# Patient Record
Sex: FEMALE | Race: BLACK OR AFRICAN AMERICAN | NOT HISPANIC OR LATINO | Employment: FULL TIME | URBAN - METROPOLITAN AREA
[De-identification: names, ages, dates, MRNs, and addresses within clinical notes are randomized per-mention and may not be internally consistent; named-entity substitution may affect disease eponyms.]

---

## 2021-03-05 ENCOUNTER — OCCUPATIONAL HEALTH (OUTPATIENT)
Dept: URGENT CARE | Facility: CLINIC | Age: 46
End: 2021-03-05

## 2021-03-05 DIAGNOSIS — Z02.83 ENCOUNTER FOR DRUG SCREENING: Primary | ICD-10-CM

## 2021-03-05 DIAGNOSIS — Z02.89 ENCOUNTER FOR EXAMINATION REQUIRED BY DEPARTMENT OF TRANSPORTATION (DOT): Primary | ICD-10-CM

## 2021-03-05 PROCEDURE — 80305 DRUG TEST PRSMV DIR OPT OBS: CPT | Mod: S$GLB,,, | Performed by: PHYSICIAN ASSISTANT

## 2021-03-05 PROCEDURE — 99499 UNLISTED E&M SERVICE: CPT | Mod: S$GLB,,, | Performed by: PHYSICIAN ASSISTANT

## 2021-03-05 PROCEDURE — 80305 OOH COLLECTION ONLY DRUG SCREEN: ICD-10-PCS | Mod: S$GLB,,, | Performed by: PHYSICIAN ASSISTANT

## 2021-03-05 PROCEDURE — 99499 PHYSICAL, RECERT DOT/CDL: ICD-10-PCS | Mod: S$GLB,,, | Performed by: PHYSICIAN ASSISTANT

## 2022-01-26 ENCOUNTER — TELEPHONE (OUTPATIENT)
Dept: BARIATRICS | Facility: CLINIC | Age: 47
End: 2022-01-26
Payer: COMMERCIAL

## 2022-01-26 NOTE — TELEPHONE ENCOUNTER
----- Message from Micheline Fregoso sent at 1/26/2022 11:59 AM CST -----  Regarding: speak with nurse  Contact: patient  [phone] please call patient have question for the nurse concerning a procedure the doctor does said the doctor performed surgery in 2007 now looking to schedule another surgery waiting on a call back thanks.

## 2022-01-26 NOTE — TELEPHONE ENCOUNTER
Phoned pt in regards to interest in scheduling Bariatric surgery. Pt confirm she is interested and stated she will call back in 5 minutes.

## 2022-04-01 ENCOUNTER — TELEPHONE (OUTPATIENT)
Dept: BARIATRICS | Facility: CLINIC | Age: 47
End: 2022-04-01
Payer: COMMERCIAL

## 2022-04-01 NOTE — TELEPHONE ENCOUNTER
----- Message from Silvia Sosa sent at 4/1/2022 11:49 AM CDT -----  Contact: @[phone]  Pt requesting call back to see if she can have her appt now instead of 1:30 today

## 2022-04-11 ENCOUNTER — TELEPHONE (OUTPATIENT)
Dept: BARIATRICS | Facility: CLINIC | Age: 47
End: 2022-04-11
Payer: COMMERCIAL

## 2022-04-11 NOTE — TELEPHONE ENCOUNTER
Called and spoke with pt.  Pt had a LRNY with Dr. Killian in 2007.  Pt scheduled for f/u with Dr. Killian to discuss possible revision.  Text sent for pt to set up [employer] so that pt can have virtual visits.  After mychart is set up, pt will call bariatric clinic to schedule for virtual RD visit.    Legacy documents of operative report scanned into the media.

## 2022-04-11 NOTE — TELEPHONE ENCOUNTER
----- Message from Silvana Miller sent at 4/11/2022 12:25 PM CDT -----  Regarding: Pt Inquiry  Pt called to f/u on what the next step after financial consults is.      Can patient be contacted on [employer]:No       Requesting Call back number:[phone]

## 2022-04-19 ENCOUNTER — TELEPHONE (OUTPATIENT)
Dept: BARIATRICS | Facility: CLINIC | Age: 47
End: 2022-04-19
Payer: COMMERCIAL

## 2022-04-19 NOTE — TELEPHONE ENCOUNTER
----- Message from Silvana Miller sent at 4/19/2022 11:35 AM CDT -----  Regarding: Appt R/s  Pt called to r/s appt set  for 4/19 at 4:15 to next Tuesday as early as possible.     Can patient be contacted on [employer]:YES       Requesting Call back number:[phone]

## 2022-04-19 NOTE — TELEPHONE ENCOUNTER
Phoned pt in regard to rescheduling f/u for Bariatric surgery. Pt stated she had to cancel consult on today because her daughter is in the hospital. Informed pt a virtual consult is a option. Pt stated she was not aware of virtual appts.      Gained assistance from nurse with scheduling virtual consult with  due to no access. Informed pt of virtual visit instructions, pt verbalized understanding. Pt agreed to reach out to clinic if she has any issues with  Logging onto virtual appt.

## 2022-05-03 ENCOUNTER — DOCUMENTATION ONLY (OUTPATIENT)
Dept: BARIATRICS | Facility: CLINIC | Age: 47
End: 2022-05-03
Payer: COMMERCIAL

## 2022-05-03 ENCOUNTER — TELEPHONE (OUTPATIENT)
Dept: BARIATRICS | Facility: CLINIC | Age: 47
End: 2022-05-03

## 2022-05-03 ENCOUNTER — OFFICE VISIT (OUTPATIENT)
Dept: BARIATRICS | Facility: CLINIC | Age: 47
End: 2022-05-03
Payer: COMMERCIAL

## 2022-05-03 DIAGNOSIS — Z98.0 S/P BYPASS GASTROENTEROSTOMY: ICD-10-CM

## 2022-05-03 DIAGNOSIS — Z01.818 PREOPERATIVE TESTING: Primary | ICD-10-CM

## 2022-05-03 PROCEDURE — 99203 OFFICE O/P NEW LOW 30 MIN: CPT | Mod: 95,,, | Performed by: SURGERY

## 2022-05-03 PROCEDURE — 99203 PR OFFICE/OUTPT VISIT, NEW, LEVL III, 30-44 MIN: ICD-10-PCS | Mod: 95,,, | Performed by: SURGERY

## 2022-05-03 RX ORDER — AMLODIPINE BESYLATE 10 MG/1
5 TABLET ORAL DAILY
Status: ON HOLD | COMMUNITY
End: 2023-05-15 | Stop reason: HOSPADM

## 2022-05-03 NOTE — PROGRESS NOTES
"History & Physical    SUBJECTIVE:     History of Present Illness:  Patient is a 46 y.o. female presents with s/p gastric bypass by me 2007 for bmi 59 sherrie on cpap and essential htn.  Since surgery she lost and regained weight.  She also is concerned as she may have recurrence of her sleep apnea.  On her sleep questionnaire she scored 2.  She says she is 5"9" and 305 which makes her bmi 45 (she says she was around 400 at the time of the original surgery.    No chief complaint on file.      Review of patient's allergies indicates:  No Known Allergies    Current Outpatient Medications   Medication Sig Dispense Refill    amLODIPine (NORVASC) 5 MG tablet Take 5 mg by mouth once daily.       No current facility-administered medications for this visit.       Past Medical History:   Diagnosis Date    Hypertension      Past Surgical History:   Procedure Laterality Date    GASTRIC BYPASS       Family History   Problem Relation Age of Onset    Cancer Mother      Social History     Tobacco Use    Smoking status: Never Smoker        Review of Systems:  Review of Systems   Constitutional: Negative for fever.   Respiratory: Negative for chest tightness and shortness of breath.         Gets lazaro   Cardiovascular: Negative for chest pain.   Gastrointestinal: Negative for abdominal pain, constipation, diarrhea, nausea and vomiting.        Denies heartburn.  She takes iron and has some hardish stools.    Genitourinary: Negative for difficulty urinating and dysuria.   Skin: Negative for rash.   Hematological: Does not bruise/bleed easily.       OBJECTIVE:     Vital Signs (Most Recent)              Physical Exam:  Physical Exam    Laboratory  None available    Diagnostic Results:  None available    ASSESSMENT/PLAN:     Morbid obesity with comorbidities.  Gaining weight and would like to lose more weight.    PLAN:       I discussed possible medications for weight loss and she is not interested.  Will do work up for possible redo " gastric pouch procedure for further weight loss and stopping weight regain.         The patient location is: in her car in [place]  The chief complaint leading to consultation is: morbid obesity    Visit type: audio only    17 minutes of total time spent on the encounter, which includes face to face time and non-face to face time preparing to see the patient (eg, review of tests), Obtaining and/or reviewing separately obtained history, Documenting clinical information in the electronic or other health record, Independently interpreting results (not separately reported) and communicating results to the patient/family/caregiver, or Care coordination (not separately reported).     Each patient to whom he or she provides medical services by telemedicine is:  (1) informed of the relationship between the physician and patient and the respective role of any other health care provider with respect to management of the patient; and (2) notified that he or she may decline to receive medical services by telemedicine and may withdraw from such care at any time.

## 2022-05-03 NOTE — TELEPHONE ENCOUNTER
----- Message from Colt Killian MD sent at 5/3/2022  4:58 PM CDT -----  Will do work up for possible redo gastric pouch procedure for further weight loss and stopping weight regain.

## 2022-05-16 ENCOUNTER — TELEPHONE (OUTPATIENT)
Dept: BARIATRICS | Facility: CLINIC | Age: 47
End: 2022-05-16
Payer: COMMERCIAL

## 2022-05-16 NOTE — TELEPHONE ENCOUNTER
----- Message from Rafael Raymundo sent at 5/16/2022  3:06 PM CDT -----  Contact: @ [phone]  Patient calling to get an update on the next step with treatment, pls advise     digital block w 4 ml lidocaine 1 %

## 2022-05-17 ENCOUNTER — PATIENT MESSAGE (OUTPATIENT)
Dept: BARIATRICS | Facility: CLINIC | Age: 47
End: 2022-05-17
Payer: COMMERCIAL

## 2022-05-26 ENCOUNTER — HOSPITAL ENCOUNTER (OUTPATIENT)
Dept: CARDIOLOGY | Facility: CLINIC | Age: 47
Discharge: HOME | End: 2022-05-26
Payer: COMMERCIAL

## 2022-05-26 ENCOUNTER — HOSPITAL ENCOUNTER (OUTPATIENT)
Dept: RADIOLOGY | Facility: HOSPITAL | Age: 47
Discharge: HOME | End: 2022-05-26
Attending: SURGERY
Payer: COMMERCIAL

## 2022-05-26 DIAGNOSIS — Z98.0 S/P BYPASS GASTROENTEROSTOMY: ICD-10-CM

## 2022-05-26 DIAGNOSIS — Z01.818 PREOPERATIVE TESTING: ICD-10-CM

## 2022-05-26 PROCEDURE — 71046 XR CHEST PA AND LATERAL: ICD-10-PCS | Mod: 26,,, | Performed by: RADIOLOGY

## 2022-05-26 PROCEDURE — 93010 EKG 12-LEAD: ICD-10-PCS | Mod: S$GLB,,, | Performed by: INTERNAL MEDICINE

## 2022-05-26 PROCEDURE — 71046 X-RAY EXAM CHEST 2 VIEWS: CPT | Mod: 26,,, | Performed by: RADIOLOGY

## 2022-05-26 PROCEDURE — 71046 X-RAY EXAM CHEST 2 VIEWS: CPT | Mod: TC,FY

## 2022-05-26 PROCEDURE — 93010 ELECTROCARDIOGRAM REPORT: CPT | Mod: S$GLB,,, | Performed by: INTERNAL MEDICINE

## 2022-05-26 PROCEDURE — 93005 EKG 12-LEAD: ICD-10-PCS | Mod: S$GLB,,, | Performed by: SURGERY

## 2022-05-26 PROCEDURE — 93005 ELECTROCARDIOGRAM TRACING: CPT | Mod: S$GLB,,, | Performed by: SURGERY

## 2022-06-02 ENCOUNTER — CLINICAL SUPPORT (OUTPATIENT)
Dept: BARIATRICS | Facility: CLINIC | Age: 47
End: 2022-06-02
Payer: COMMERCIAL

## 2022-06-02 VITALS — HEIGHT: 69 IN | WEIGHT: 293 LBS | BODY MASS INDEX: 43.4 KG/M2

## 2022-06-02 DIAGNOSIS — Z71.3 DIETARY COUNSELING: ICD-10-CM

## 2022-06-02 DIAGNOSIS — Z98.84 S/P BARIATRIC SURGERY: ICD-10-CM

## 2022-06-02 DIAGNOSIS — E66.01 MORBID OBESITY WITH BMI OF 40.0-44.9, ADULT: ICD-10-CM

## 2022-06-02 PROCEDURE — 97802 MEDICAL NUTRITION INDIV IN: CPT | Mod: S$GLB,,, | Performed by: DIETITIAN, REGISTERED

## 2022-06-02 PROCEDURE — 99499 NO LOS: ICD-10-PCS | Mod: S$GLB,,, | Performed by: DIETITIAN, REGISTERED

## 2022-06-02 PROCEDURE — 99499 UNLISTED E&M SERVICE: CPT | Mod: S$GLB,,, | Performed by: DIETITIAN, REGISTERED

## 2022-06-02 PROCEDURE — 99999 PR PBB SHADOW E&M-EST. PATIENT-LVL II: ICD-10-PCS | Mod: PBBFAC,,, | Performed by: DIETITIAN, REGISTERED

## 2022-06-02 PROCEDURE — 97802 PR MED NUTR THER, 1ST, INDIV, EA 15 MIN: ICD-10-PCS | Mod: S$GLB,,, | Performed by: DIETITIAN, REGISTERED

## 2022-06-02 PROCEDURE — 99999 PR PBB SHADOW E&M-EST. PATIENT-LVL II: CPT | Mod: PBBFAC,,, | Performed by: DIETITIAN, REGISTERED

## 2022-06-02 NOTE — PATIENT INSTRUCTIONS
Menu Plan: 800-1000 Calories;  grams of Protein    DAY 1     Breakfast  ½ cup 2% cottage cheese  ¼ cup fruit (no sugar added)    Snack  2% mozzarella string cheese  10 grapes    Lunch  2oz Lean hamburger or turkey toshia  1 slice low-fat cheese  ¼ cup green beans    Snack  200 calorie low-carb protein drink (4 grams sugar or less)    Dinner  2oz chicken thigh  ¼ cup cooked spinach     Snack  Atkins bar (15g protein)      DAY 2    Breakfast  1 egg with 1oz shredded cheddar cheese and 2T salsa    Snack  200 calorie low-carb protein drink (4 grams sugar or less)    Lunch  Lettuce Wraps: 2oz sliced turkey, 1 slice low-fat Swiss cheese, tomato, and mustard wrapped in a Raymond lettuce leaf    Snack  ½ cup low-fat cottage cheese  Pear cup (no sugar added)    Dinner  2oz baked fish  ½ cup cooked broccoli    Snack  Sugar-free pudding cup      DAY 3    Breakfast   ½ cup low-fat ricotta cheese w/ Splenda to harmeet  ½ scoop Vanilla protein powder   ¼ cup fresh fruit    Snack  2% string cheese  6 unsalted almonds    Lunch  Tuna/Chicken Salad: 2oz canned tuna/chicken, 1 egg white, and 1 tsp light paz  Pineapple cup (no sugar added)    Snack  200 calorie low-carb protein drink (4 grams sugar or less)    Dinner  ½ baked pork chop   ¼ cup beans      DAY 4    Breakfast  200 calorie low-carb protein drink (4 grams sugar or less)    Snack  Boiled egg    Lunch  ½ cup grilled shrimp  Salad w/ 2 tbsp crumbled fat-free feta  1 tbsp light vinaigrette    Snack  200 calorie low-carb protein drink (4 grams sugar or less)    Dinner  ¾ cup red beans    Snack  Mini Babybell light      DAY 5    Breakfast  [place] pie: 3oz Greek yogurt, 1 tbsp Splenda, ½ individual pack Crystal Light lemonade. Top with ¼ cup chopped walnuts     Snack  3-4 lean ham or turkey slices, ¼ - ½ cup fruit    Lunch  Fiesta Chicken: 2oz canned chicken, 1oz shredded cheddar cheese, ¼ cup black beans  Top with 2 tbsp salsa and a small dollop light sour  cream    Snack  200 calorie low-carb protein drink (4 grams sugar or less)    Dinner  Omelette: ¼ cup Egg Beaters, 4 large (1oz) shrimp, 1oz shredded low-fat cheese. Add bell pepper, onion, mushrooms, green onions, or salsa, optional.      DAY 6    Breakfast  1 reynold or 2 links turkey sausage  ½ cup fruit    Snack  200 calorie low-carb protein drink (4 grams sugar or less)    Lunch  Grilled tilapia  Salad of baby spinach leaves with light dressing    Snack  200 calorie low-carb protein drink (4 grams sugar or less)    Dinner  Chicken thigh simmered in 98% fat free cream of mushroom soup  ½ cup cooked green beans      Meal Ideas for Regular Bariatric Diet  *Recipes and products available at [email or web address].com      Breakfast: (15-20g protein)    - Egg white omelet: 2 egg whites or ½ cup Egg Beaters. (Optional proteins: cheese, shrimp, black beans, chicken, sliced turkey) (Optional veggies: tomatoes, salsa, spinach, mushrooms, onions, green peppers, or small slice avocado)     - Egg and sausage: 1 egg or ¼ cup Egg Beaters (any variety), with 1 reynold or 2 links of Turkey sausage or Veggie breakfast sausage (S[employer] or [employer])    - Crust-less breakfast quiche: To make a glass pie dish, mix 4oz part skim Ricotta, 1 cup skim milk, and 2 eggs as your base. Add protein: shredded cheese, sliced lean ham or turkey, turkey wells/sausage. Add veggies: tomato, onion, green onion, mushroom, green pepper, spinach, etc.    - Yogurt parfait: Mix 1 - 6oz container Dannon Light N Fit vanilla yogurt, with ¼ cup crushed unsalted nuts    - Cottage cheese and fruit: ½ cup part-skim cottage cheese or ricotta cheese topped with fresh fruit or sugar free preserves     - Irene Mendes's Vanilla Egg custard* (add 2 Tbsp instant coffee granules to make Cappuccino Custard*)    - Hi-Protein café latte (skim milk, decaf coffee, 1 scoop protein powder). Optional to add Sugar free syrup or extract flavoring.    - Breakfast Lox: spread fat  free cream cheese on slices of smoked salmon. Serve over scrambled or egg over easy (sauteed with nonstick cookspray) OR on a cucumber slice    - Eggwhich: Scramble or cook 1 large egg over easy using nonstick cookspray. Place between 2 slices of Salvadorean wells and low fat cheese.     Lunch: (20-30g protein)    - ½ cup Black bean soup (Homemade or Progresso), with ¼ cup shredded low-fat cheese. Top with chopped tomato or fresh salsa.     - Lean deli turkey breast and low-fat sliced cheese, mustard or light paz to moisten, rolled up together, or wrapped in a Raymond lettuce leaf    - Chicken salad made from dinner leftovers, moisten with low-fat salad dressing or light paz. Also try leftover salmon, shrimp, tuna or boiled eggs. Serve ½ cup over dark green salad    - Fat-free canned refried beans, topped with ¼ cup shredded low-fat cheese. Top with chopped tomato or fresh salsa.     - Greek salad: Top mixed greens with 1-2oz grilled chicken, tomatoes, red onions, 2-3 kalamata olives, and sprinkle lightly with feta cheese. Spritz with Balsamic vinegar to taste.     - Crust-less lunch quiche: To make a glass pie dish, mix 4oz part skim Ricotta, 1 cup skim milk, and 2 eggs as your base. Add protein: shredded cheese, sliced lean ham or turkey, shrimp, chicken. Add veggies: tomato, onion, green onion, mushroom, green pepper, spinach, artichoke, broccoli, etc.    - Pizza bake: spread a  bennie sabi mushroom with tomato sauce, low-fat shredded mozzarella and turkey pepperoni or [place] wells. Add any veggies. Roast for 10-15 minutes, until cheese melted.     - Cucumber crab bites: Spread ¼ cup crab dip (lump crabmeat + light cream cheese and green onions) over sliced cucumber.     - Chicken with light spinach and artichoke dip*: Puree in : 6oz cooked and drained spinach, 2 cloves garlic, 1 can cannelloni beans, ½ cup chopped green onions, 1 can drained artichoke hearts (not marinated in oil), lemon juice and  basil. Mix in 2oz chopped up chicken.    Supper: (20-30g protein)    - Serve grilled fish over dark green salad tossed with low-fat dressing, served with grilled asparagus holley     - Rotisserie chicken salad: served with sliced strawberries, walnuts, fat-free feta cheese crumbles and 1 tbsp Jenningss Own Light Raspberry [place] Vinaigrette    - Shrimp cocktail: Dip cold boiled shrimp in homemade low-sugar cocktail sauce (1/2 cup Francois One Carb ketchup, 2 tbsp horseradish, 1/4 tsp hot sauce, 1 tsp Worcestershire sauce, 1 tbsp freshly-squeezed lemon juice). Serve with dark green salad, walnuts, and crumbled blue cheese drizzled with olive oil and Balsamic vinegar    - Tuna Melt: Spread tuna salad onto 2 thick slices of tomato. Top with low-fat cheese and broil until cheese is melted. May also be made with chicken salad of shrimp salad. [place] with different types of cheeses.    - Chicken or beef fajitas (no tortilla, rice, beans, chips). Top meat and veggies w/ fresh salsa, fat free sour cream.     - Homemade low-fat Chili using extra lean ground beef or ground turkey. Top with shredded cheese and salsa as desired. May add dollop fat-free sour cream if desired    - Chicken parmesan: Top chicken breast w/ low sugar marinara sauce, mozzarella cheese and bake until chicken reaches 165*.  Serve w/ spaghetti SQUASH or Macedonian cut green beans    - Dinner Omelet with shrimp or chicken and onion, green peppers and chives.    - No noodle lasagna: Use sliced zucchini or eggplant in place of noodles.  Layer with part skim ricotta cheese and low sugar meat sauce (use very lean ground beef or ground turkey).    - Mexican chicken bake: Bake chunks of chicken breast or thigh with taco seasoning, Pace brand enchilada sauce, green onions and low-fat cheese. Serve with ¼ cup black beans or fat free refried beans topped with chopped tomatoes or salsa.    - Paul frozen meatballs, simmered in Classico Marijo[place]ina sauce. Different  flavors of salsa or spaghetti sauce create different dishes! Sprinkle with parmesan cheese. Serve with grilled or steamed veggies, or a dark green salad.    - Simmer boneless skinless chicken thigh chunks in Classico Marinara sauce or roasted salsa until tender with chopped onion, bell pepper, garlic, mushrooms, spinach, etc.     - Hamburger or veggie burger, without the bun, dressed the way you like. Served with grilled or steamed veggies.    - Eggplant parmesan: Bake slices of eggplant at 350 degrees for 15 minutes. Layer tomato sauce, sliced eggplant and low-fat mozzarella cheese in a baking dish and cover with foil. Bake 30-40 more minutes or until bubbly. Uncover and bake at 400 degrees for about 15 more minutes, or until top is slightly crisp.    - Fish tacos: grilled/baked white fish, wrapped in Raymond lettuce leaf, topped with salsa, shredded low-fat cheese, and light coleslaw.    - Chicken juvenal: Sprinkle chicken w/ 1 tsp of hidden valley ranch dip mix. Then grill chicken and top with black beans, salsa and 1 tsp fat free sour cream.     - Cauliflower pizza crust: Use cauliflower as crust (see recipe on [facility], no flour!). Top w/ low fat cheese, turkey pepperoni and veggies and bake again    - chicken or turkey crust pizza: use ground chicken or turkey instead of cauliflower, spread in [place] and bake at 350 for about 20-30 minutes(may want to add garlic, black pepper, oregano and other herbs to ground meat mixture).  Remove and top w/ low fat cheese, turkey pepperoni and veggies and bake again for another 10 minutes or until cheese is browned.     Snacks: (100-200 calories; >5g protein)    - 1 low-fat cheese stick with 8 cherry tomatoes or 1 serving fresh fruit  - 4 thin slices fat-free turkey breast and 1 slice low-fat cheese  - 4 thin slices fat-free honey ham with wedge of melon  - 6-8 edamame pods (equivalent to about 1/4 cup edamame without pods).   - 1/4 cup unsalted nuts with ½ cup fruit  -  6-oz container Dannon Light n Fit vanilla yogurt, topped with 1oz unsalted nuts         - apple, celery or baby carrots spread with 2 Tbsp PB2  - apple slices with 1 oz slice low-fat cheese  - Apple slices dipped in 2 Tbsp of PB2  - celery, cucumber, bell pepper or baby carrots dipped in ¼ cup hummus bean spread or light spinach and artichoke dip (*recipe in lunch section)  - celery, cucumber, baby carrots dipped in high protein greek yogurt (Mix 16 oz plain greek yogurt + 1 packet of hidden valley ranch dip mix)  - Junior Links Beef Steak - 14g protein! (similar to beef jerky)  - 2 wedges Laughing Cow - Light Herb & Garlic Cheese with sliced cucumber or green bell pepper  - 1/2 cup low-fat cottage cheese with ¼ cup fruit or ¼ cup salsa  - RTD Protein drinks: Atkins, Low Carb Slim Fast, EAS light, Muscle Milk Light, etc.  - Homemade Protein drinks: [facility] Soy95, Isopure, Nectar, UNJURY, Whey Gourmet, etc. Mix 1 scoop powder with 8oz skim/1% milk or light soymilk.  - Protein bars: Atkins, EAS, Pure Protein, Think Thin, Detour, etc. Must have 0-4 grams sugar - Read the label.    Takeout Options: No more than twice/week  Deli - Salads (no pasta or rice), meats, cheeses. Roasted chicken. Lox (salmon)    Mexican - Platters which don't include tortillas, chips, or rice. Go easy on the beans. Example: Fajitas without the tortillas. Ask the  not to bring chips to the table if they are too tempting.    Greek - Meat or fish and vegetable, but no bread or rice. Including hummus, baba ganoush, etc, is OK. Most sit-down Greek restaurants can provide you with cucumber slices for dipping instead of joe bread.    Fast Food (Avoid as much as possible) - Salads (no croutons and limit salad dressing to 2 tbsp), grilled chicken sandwich without the bun and ask for no paz. Janias low fat chili or Taco Bell pintos and cheese.    BBQ - The meats are fine if you ask for sauces on the side, but most of the traditional side dishes are  loaded with carbs. Humphrey slaw, baked beans and BBQ sauce are typically made with sugar.    Chinese - Nothing deep-fried, no rice or noodles. Many Chinese sauces have starch and sugar in them, so you'll have to use your judgement. If you find that these sauces trigger cravings, or cause Dumping, you can ask for the sauce to be made without sugar or just use soy sauce.

## 2022-06-02 NOTE — PROGRESS NOTES
NUTRITIONAL CONSULT    Referring Physician: Colt Killian M.D.   Reason for MNT Referral: Initial assessment for laparoscopic Jm-en-Y Revision work-up    PAST MEDICAL HISTORY:   Patient is a 46 y.o. female presents with s/p gastric bypass 2007 with Dr. Killian. Since surgery she has lost over 100 lbs but has regained some weight. Still maintaining 100 lbs weight loss (41% EW) but states she wants to lose more.  Admits she has not been eating right, exercising regularly, taking her vitamins.    Doesn't like pasta. Cannot eat much rice. Weakness for corn, bread, fried foods, potatoes and sweets. Begins belching/indigestion after eating most meals. No Dumping syndrome from sweets or fried foods. Loves cake. Does not get sick from eating it.    Past Medical History:   Diagnosis Date    Hypertension        CLINICAL DATA:  46 y.o.-year-old Black or  female.  Height: 5 ft. 9 in.  Weight: 298 lbs  TWL: 102 lbs  IBW: 153 lbs  BMI: 44.1 (from 59)  EWL: 41%    Goal for Bariatric Surgery: to improve health, to improve quality of life, to lose weight and to prevent future medical conditions    DAILY NUTRITIONAL NEEDS: pre-op nutritional bariatric guidelines to promote weight loss  [id number] Calories    Grams Protein    NUTRITION & HEALTH HISTORY:  Greatest challenge: dining out frequency, sweets, starchy CHO, portion control, snacking at night, irregular meal patterns, emotional eating and high-fat diet    Current diet recall:     - cup of water  11am: fried chicken leg, 1/2 bottle of root beer  - granola bar  3pm: 2 slices meat lovers pizza, 1/2 bottle root beer  7:30pm: ham and cheese Hot Pocket, water    Current Diet:  Meal pattern: Irregular  Protein supplements: 1-2/week - SK Slim and Trim strawberry + fiber and probiotic.   Snacking: junk foods, sweets  Vegetables: Likes a variety. Eats 2-3 times per week.  Fruits: Likes a variety. Eats 2-3 times per week.  Beverages: water, soda and  sugary beverages. Whole milk.  Dining out: Daily. Weekly. Mostly fast food, restaurants and take-out. Subway 6in on flat bread.  Cooking at home: Weekly. Monthly. Mostly baked, grilled, smothered and fried meat, fish, starchy CHO and vegetables. Fried/baked chicken/fish with corn and mashed potatoes. Fried chicken with red beans and 1 tbsp rice.    Exercise:  Past exercise: Fair    Current exercise: None  Restrictions to exercise: none    Vitamins / Minerals / Herbs:   Vit D Rx  Iron pills occ - constipation    Labs:   Reviewed.     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  Lives with her  and 3 kids.  Grocery shopping and food prep done by the pt.  Patient believes the household will be supportive after surgery.  Alcohol: Socially.  Smoking: None.    ASSESSMENT:  · Patient reports attempts at weight loss, only to regain lost weight.  · Patient demonstrated knowledge of healthy eating behaviors and exercise patterns; admits to not eating healthy and not exercising at this point.  · Patient states willingness to change lifestyle and make behavior modifications.        Barriers to Education: none    Stage of change: determination    NUTRITION DIAGNOSIS:     Morbid Obesity related to Excessive carbohydrate intake, Excessive calorie intake and Physical inactivity as evidence by BMI.    BARIATRIC DIET DISCUSSION/PLAN:  Discussed diet after surgery and related to patient's food record.  Reviewed nutrition guidelines for before and after surgery.  Answered all questions.  Continue to review Bariatric Nutrition Guidebook at home and call with any questions.  Work on Bariatric Nutrition Checklist.  Work on expanding variety of vegetables.  Work on gradually cutting back on starchy CHO in the diet.  Begin trying various protein supplements to determine preference.  1200-calorie diet.  1500-calorie  diet.  5-6 meals per day.  Start including protein supplements in the diet plan daily.  Reduce frequency of dining out.  More grocery shopping and meal preparation at home.  Increase exercise.    RECOMMENDATIONS:  Back on track with Bariatric diet plan, exercise and vit/min regimen.    Patient is a potential candidate for bariatric surgery - Bypass Revision.    Follow up in one month.  Needs additional visit(s) with RD for revision protocol.    Patient verbalized understanding.    Expect fair  compliance after surgery at this time.    Communicated nutrition plan with bariatric team.    SESSION TIME:  60 minutes

## 2022-06-03 ENCOUNTER — PATIENT MESSAGE (OUTPATIENT)
Dept: ENDOSCOPY | Facility: HOSPITAL | Age: 47
End: 2022-06-03
Payer: COMMERCIAL

## 2022-06-10 ENCOUNTER — PATIENT MESSAGE (OUTPATIENT)
Dept: BARIATRICS | Facility: CLINIC | Age: 47
End: 2022-06-10
Payer: COMMERCIAL

## 2022-06-14 ENCOUNTER — PATIENT MESSAGE (OUTPATIENT)
Dept: BARIATRICS | Facility: CLINIC | Age: 47
End: 2022-06-14
Payer: COMMERCIAL

## 2022-06-14 DIAGNOSIS — R79.89 LOW VITAMIN D LEVEL: Primary | ICD-10-CM

## 2022-06-14 DIAGNOSIS — E61.1 LOW IRON: ICD-10-CM

## 2022-06-14 NOTE — TELEPHONE ENCOUNTER
----- Message from Colt Killian MD sent at 5/26/2022  5:56 PM CDT -----  Please let her know she is prediabetic and have her follow up with her pcp.    Please start vit d 23801 iu weekly and Iron Gluconate 325mg for 6  months and repeat studies.

## 2022-06-14 NOTE — TELEPHONE ENCOUNTER
Spoke with pt, informed her of recent lab results and recommendations from Dr. Killian.  Explained I would be sending a RX for vit D 50,000 IU weekly to take once weekly for 6 months, then we will recheck level.  Pt explained PCP already prescribed ferrous gluconate 325 mg daily.  Pt complained of constipation from the iron, recommended Miralax.  Pt could not remember first name of PCP, Dr. Vickers with [facility].  She explained she had an appointment this Thursday, 6/16/2022 and would communicate Dr. Killian's message regarding being prediabetic, and vit D addition. Instructed pt to reach out to us with any concerns or questions. Patient verbalized understanding.

## 2022-06-15 RX ORDER — ERGOCALCIFEROL 1.25 MG/1
50000 CAPSULE ORAL WEEKLY
Qty: 24 CAPSULE | Refills: 0 | Status: ON HOLD | OUTPATIENT
Start: 2022-06-15 | End: 2023-05-14

## 2022-06-30 ENCOUNTER — ANESTHESIA EVENT (OUTPATIENT)
Dept: ENDOSCOPY | Facility: HOSPITAL | Age: 47
End: 2022-06-30
Payer: COMMERCIAL

## 2022-06-30 ENCOUNTER — HOSPITAL ENCOUNTER (OUTPATIENT)
Facility: HOSPITAL | Age: 47
Discharge: HOME | End: 2022-06-30
Attending: STUDENT IN AN ORGANIZED HEALTH CARE EDUCATION/TRAINING PROGRAM | Admitting: STUDENT IN AN ORGANIZED HEALTH CARE EDUCATION/TRAINING PROGRAM
Payer: COMMERCIAL

## 2022-06-30 ENCOUNTER — ANESTHESIA (OUTPATIENT)
Dept: ENDOSCOPY | Facility: HOSPITAL | Age: 47
End: 2022-06-30
Payer: COMMERCIAL

## 2022-06-30 VITALS
TEMPERATURE: 98 F | OXYGEN SATURATION: 96 % | HEIGHT: 69 IN | DIASTOLIC BLOOD PRESSURE: 94 MMHG | SYSTOLIC BLOOD PRESSURE: 143 MMHG | HEART RATE: 62 BPM | WEIGHT: 290 LBS | RESPIRATION RATE: 16 BRPM | BODY MASS INDEX: 42.95 KG/M2

## 2022-06-30 DIAGNOSIS — Z98.84 H/O GASTRIC BYPASS: ICD-10-CM

## 2022-06-30 LAB
B-HCG UR QL: NEGATIVE
CTP QC/QA: YES

## 2022-06-30 PROCEDURE — 37000009 HC ANESTHESIA EA ADD 15 MINS: Performed by: STUDENT IN AN ORGANIZED HEALTH CARE EDUCATION/TRAINING PROGRAM

## 2022-06-30 PROCEDURE — 43239 PR EGD, FLEX, W/BIOPSY, SGL/MULTI: ICD-10-PCS | Mod: ,,, | Performed by: STUDENT IN AN ORGANIZED HEALTH CARE EDUCATION/TRAINING PROGRAM

## 2022-06-30 PROCEDURE — 88305 TISSUE EXAM BY PATHOLOGIST: CPT | Performed by: PATHOLOGY

## 2022-06-30 PROCEDURE — 25000003 PHARM REV CODE 250: Performed by: NURSE ANESTHETIST, CERTIFIED REGISTERED

## 2022-06-30 PROCEDURE — 37000008 HC ANESTHESIA 1ST 15 MINUTES: Performed by: STUDENT IN AN ORGANIZED HEALTH CARE EDUCATION/TRAINING PROGRAM

## 2022-06-30 PROCEDURE — 63600175 PHARM REV CODE 636 W HCPCS: Performed by: NURSE ANESTHETIST, CERTIFIED REGISTERED

## 2022-06-30 PROCEDURE — 43239 EGD BIOPSY SINGLE/MULTIPLE: CPT | Performed by: STUDENT IN AN ORGANIZED HEALTH CARE EDUCATION/TRAINING PROGRAM

## 2022-06-30 PROCEDURE — 27201012 HC FORCEPS, HOT/COLD, DISP: Performed by: STUDENT IN AN ORGANIZED HEALTH CARE EDUCATION/TRAINING PROGRAM

## 2022-06-30 PROCEDURE — 88305 TISSUE EXAM BY PATHOLOGIST: ICD-10-PCS | Mod: 26,,, | Performed by: PATHOLOGY

## 2022-06-30 PROCEDURE — 81025 URINE PREGNANCY TEST: CPT | Performed by: STUDENT IN AN ORGANIZED HEALTH CARE EDUCATION/TRAINING PROGRAM

## 2022-06-30 PROCEDURE — 43239 EGD BIOPSY SINGLE/MULTIPLE: CPT | Mod: ,,, | Performed by: STUDENT IN AN ORGANIZED HEALTH CARE EDUCATION/TRAINING PROGRAM

## 2022-06-30 PROCEDURE — 88305 TISSUE EXAM BY PATHOLOGIST: CPT | Mod: 26,,, | Performed by: PATHOLOGY

## 2022-06-30 RX ORDER — SODIUM CHLORIDE 9 MG/ML
INJECTION, SOLUTION INTRAVENOUS CONTINUOUS
Status: DISCONTINUED | OUTPATIENT
Start: 2022-06-30 | End: 2022-06-30 | Stop reason: HOSPADM

## 2022-06-30 RX ORDER — LIDOCAINE HYDROCHLORIDE 20 MG/ML
INJECTION, SOLUTION EPIDURAL; INFILTRATION; INTRACAUDAL; PERINEURAL
Status: DISCONTINUED | OUTPATIENT
Start: 2022-06-30 | End: 2022-06-30

## 2022-06-30 RX ORDER — PROPOFOL 10 MG/ML
VIAL (ML) INTRAVENOUS CONTINUOUS PRN
Status: DISCONTINUED | OUTPATIENT
Start: 2022-06-30 | End: 2022-06-30

## 2022-06-30 RX ORDER — PROPOFOL 10 MG/ML
VIAL (ML) INTRAVENOUS
Status: DISCONTINUED | OUTPATIENT
Start: 2022-06-30 | End: 2022-06-30

## 2022-06-30 RX ADMIN — SODIUM CHLORIDE: 0.9 INJECTION, SOLUTION INTRAVENOUS at 10:06

## 2022-06-30 RX ADMIN — PROPOFOL 100 MG: 10 INJECTION, EMULSION INTRAVENOUS at 10:06

## 2022-06-30 RX ADMIN — LIDOCAINE HYDROCHLORIDE 50 MG: 20 INJECTION, SOLUTION EPIDURAL; INFILTRATION; INTRACAUDAL; PERINEURAL at 10:06

## 2022-06-30 RX ADMIN — Medication 175 MCG/KG/MIN: at 10:06

## 2022-06-30 NOTE — TRANSFER OF CARE
"Anesthesia Transfer of Care Note    Patient: Radha Francis    Procedure(s) Performed: Procedure(s) (LRB):  EGD (ESOPHAGOGASTRODUODENOSCOPY) (N/A)    Patient location: PACU    Anesthesia Type: general    Transport from OR: Transported from OR on room air with adequate spontaneous ventilation    Post pain: adequate analgesia    Post assessment: no apparent anesthetic complications    Post vital signs: stable    Level of consciousness: awake    Nausea/Vomiting: no nausea/vomiting    Complications: none          Last vitals:   Visit Vitals  BP  135/83 (BP Location: Left arm, Patient Position: Lying)   Pulse 69   Temp 36.7 °C (98.1 °F) (Oral)   Resp 18   Ht 5' 9" (1.753 m)   Wt 131.5 kg (290 lb)   LMP 05/24/2022 (Approximate)   SpO2 96%   Breastfeeding No   BMI 42.83 kg/m²     "

## 2022-06-30 NOTE — H&P
Short Stay Endoscopy History and Physical    PCP - [facility]  Referring Physician - Colt Killian MD  [address] [place] [address]    Procedure - EGD  ASA - per anesthesia  Mallampati - per anesthesia  History of Anesthesia problems - no  Family history Anesthesia problems -  no   Plan of anesthesia - General    HPI  46 y.o. female  Reason for procedure:  S/P bypass gastroenterostomy [Z98.0]  Preoperative testing [Z01.818]      ROS:  Constitutional: No fevers, chills, No weight loss  CV: No chest pain  Pulm: No cough, No shortness of breath  GI: see HPI    Medical History:  has a past medical history of Hypertension.    Surgical History:  has a past surgical history that includes Gastric bypass.    Family History: family history includes Cancer in her mother..    Social History:  reports that she has never smoked. She does not have any smokeless tobacco history on file.    Review of patient's allergies indicates:  No Known Allergies    Medications:   Medications Prior to Admission   Medication Sig Dispense Refill Last Dose    amLODIPine (NORVASC) 5 MG tablet Take 5 mg by mouth once daily.       ergocalciferol (VITAMIN D2) 50,000 unit Cap Take 1 capsule (50,000 Units total) by mouth once a week. 24 capsule 0        Physical Exam:    Vital Signs: There were no vitals filed for this visit.    General Appearance: Well appearing in no acute distress  Abdomen: Soft, non tender, non distended with normal bowel sounds, no masses      Labs:  Lab Results   Component Value Date    WBC 5.28 05/26/2022    HGB 11.2 (L) 05/26/2022    HCT 37.5 05/26/2022     05/26/2022    CHOL 188 05/26/2022    TRIG 109 05/26/2022    HDL 67 05/26/2022    ALT 12 05/26/2022    AST 11 05/26/2022     05/26/2022    K 4.1 05/26/2022     05/26/2022    CREATININE 1.0 05/26/2022    BUN 11 05/26/2022    CO2 29 05/26/2022    TSH 0.498 05/26/2022    INR 1.0 11/03/2007    HGBA1C 5.7 (H)  05/26/2022       I have explained the risks and benefits of this endoscopic procedure to the patient including but not limited to bleeding, inflammation, infection, perforation, and death.    Assessment/Plan:     1. History of Gastric Bypass   2. ALE     - Proceed with EGD with measurements       Sadia Ovalles MD  Gastroenterology   [facility]

## 2022-06-30 NOTE — PROVATION PATIENT INSTRUCTIONS
Discharge Summary/Instructions after an Endoscopic Procedure  Patient Name: Radha Francis  Patient MRN: [id number]  Patient YOB: 1975 Thursday, June 30, 2022  Sadia Ovalles MD  Dear patient,  As a result of recent federal legislation (The Federal Cures Act), you may   receive lab or pathology results from your procedure in your [facility]   account before your physician is able to contact you. Your physician or   their representative will relay the results to you with their   recommendations at their soonest availability.  Thank you,  RESTRICTIONS:  During your procedure today, you received medications for sedation.  These   medications may affect your judgment, balance and coordination.  Therefore,   for 24 hours, you have the following restrictions:   - DO NOT drive a car, operate machinery, make legal/financial decisions,   sign important papers or drink alcohol.    ACTIVITY:  Today: no heavy lifting, straining or running due to procedural   sedation/anesthesia.  The following day: return to full activity including work.  DIET:  Eat and drink normally unless instructed otherwise.     TREATMENT FOR COMMON SIDE EFFECTS:  - Mild abdominal pain, nausea, belching, bloating or excessive gas:  rest,   eat lightly and use a heating pad.  - Sore Throat: treat with throat lozenges and/or gargle with warm salt   water.  - Because air was used during the procedure, expelling large amounts of air   from your rectum or belching is normal.  - If a bowel prep was taken, you may not have a bowel movement for 1-3 days.    This is normal.  SYMPTOMS TO WATCH FOR AND REPORT TO YOUR PHYSICIAN:  1. Abdominal pain or bloating, other than gas cramps.  2. Chest pain.  3. Back pain.  4. Signs of infection such as: chills or fever occurring within 24 hours   after the procedure.  5. Rectal bleeding, which would show as bright red, maroon, or black stools.   (A tablespoon of blood from the rectum is not serious, especially if    hemorrhoids are present.)  6. Vomiting.  7. Weakness or dizziness.  GO DIRECTLY TO THE NEAREST EMERGENCY ROOM IF YOU HAVE ANY OF THE FOLLOWING:      Difficulty breathing              Chills and/or fever over 101 F   Persistent vomiting and/or vomiting blood   Severe abdominal pain   Severe chest pain   Black, tarry stools   Bleeding- more than one tablespoon   Any other symptom or condition that you feel may need urgent attention  Your doctor recommends these additional instructions:  If any biopsies were taken, your doctors clinic will contact you in 1 to 2   weeks with any results.  - Discharge patient to home (with escort).   - Resume previous diet.   - Await pathology results.   - The patient is not currently taking anticoagulant or antiplatelet agents.     - Return to referring physician at appointment to be scheduled.  For questions, problems or results please call your physician - Sadia Ovalles MD at Work:  [phone].  [facility], EMERGENCY ROOM PHONE NUMBER: [phone]  IF A COMPLICATION OR EMERGENCY SITUATION ARISES AND YOU ARE UNABLE TO REACH   YOUR PHYSICIAN - GO DIRECTLY TO THE EMERGENCY ROOM.  Sadia Ovalles MD  6/30/2022 11:07:20 AM  This report has been verified and signed electronically.  Dear patient,  As a result of recent federal legislation (The Federal Cures Act), you may   receive lab or pathology results from your procedure in your [facility]   account before your physician is able to contact you. Your physician or   their representative will relay the results to you with their   recommendations at their soonest availability.  Thank you,  PROVATION

## 2022-06-30 NOTE — ANESTHESIA PREPROCEDURE EVALUATION
06/30/2022  Radha Francis is a 46 y.o., female.  Past Medical History:   Diagnosis Date    Hypertension      Past Surgical History:   Procedure Laterality Date    GASTRIC BYPASS       There is no problem list on file for this patient.      Pre-op Assessment    I have reviewed the Patient Summary Reports.     I have reviewed the Nursing Notes. I have reviewed the NPO Status.   I have reviewed the Medications.     Review of Systems  Anesthesia Hx:  No problems with previous Anesthesia    Social:  Non-Smoker    Cardiovascular:   Hypertension        Physical Exam  General: Alert and Oriented    Airway:  Mallampati: II   Mouth Opening: Normal  TM Distance: Normal  Tongue: Normal  Neck ROM: Normal ROM    Dental:  Intact        Anesthesia Plan  Type of Anesthesia, risks & benefits discussed:    Anesthesia Type: Gen Natural Airway  Intra-op Monitoring Plan: Standard ASA Monitors  Induction:  IV  Airway Plan: , Awake  Informed Consent: Informed consent signed with the Patient and all parties understand the risks and agree with anesthesia plan.  All questions answered.   ASA Score: 2    Ready For Surgery From Anesthesia Perspective.     .

## 2022-06-30 NOTE — ANESTHESIA POSTPROCEDURE EVALUATION
Anesthesia Post Evaluation    Patient: Radha Francis    Procedure(s) Performed: Procedure(s) (LRB):  EGD (ESOPHAGOGASTRODUODENOSCOPY) (N/A)    Final Anesthesia Type: general      Patient location during evaluation: PACU  Patient participation: Yes- Able to Participate  Level of consciousness: awake and alert and oriented  Post-procedure vital signs: reviewed and stable  Pain management: adequate  Airway patency: patent    PONV status at discharge: No PONV  Anesthetic complications: no      Cardiovascular status: blood pressure returned to baseline  Respiratory status: unassisted, room air and spontaneous ventilation  Hydration status: euvolemic  Follow-up not needed.          Vitals Value Taken Time   /94 06/30/22 1130   Temp 36.6 °C (97.9 °F) 06/30/22 1100   Pulse 62 06/30/22 1130   Resp 16 06/30/22 1130   SpO2 96 % 06/30/22 1130         No case tracking events are documented in the log.      Pain/Garth Score: Garth Score: 10 (6/30/2022 11:30 AM)

## 2022-07-05 ENCOUNTER — PATIENT MESSAGE (OUTPATIENT)
Dept: BARIATRICS | Facility: CLINIC | Age: 47
End: 2022-07-05
Payer: COMMERCIAL

## 2022-07-07 LAB
FINAL PATHOLOGIC DIAGNOSIS: NORMAL
Lab: NORMAL

## 2022-07-18 ENCOUNTER — TELEPHONE (OUTPATIENT)
Dept: BARIATRICS | Facility: CLINIC | Age: 47
End: 2022-07-18
Payer: COMMERCIAL

## 2022-07-18 NOTE — TELEPHONE ENCOUNTER
----- Message from Rafael Raymundo sent at 7/18/2022  8:06 AM CDT -----  Contact: @[phone]  Patient calling to re-schedule the  7-7th appointment  please call ( patient requesting a   Tuesday, Wednesday or Thursday Only )

## 2022-07-19 ENCOUNTER — PATIENT MESSAGE (OUTPATIENT)
Dept: GASTROENTEROLOGY | Facility: CLINIC | Age: 47
End: 2022-07-19
Payer: COMMERCIAL

## 2022-08-02 ENCOUNTER — PATIENT MESSAGE (OUTPATIENT)
Dept: BARIATRICS | Facility: CLINIC | Age: 47
End: 2022-08-02
Payer: COMMERCIAL

## 2022-08-04 ENCOUNTER — PATIENT MESSAGE (OUTPATIENT)
Dept: BARIATRICS | Facility: CLINIC | Age: 47
End: 2022-08-04
Payer: COMMERCIAL

## 2022-08-09 ENCOUNTER — CLINICAL SUPPORT (OUTPATIENT)
Dept: BARIATRICS | Facility: CLINIC | Age: 47
End: 2022-08-09
Payer: COMMERCIAL

## 2022-08-09 DIAGNOSIS — Z98.84 S/P BARIATRIC SURGERY: Primary | ICD-10-CM

## 2022-08-09 DIAGNOSIS — Z71.3 DIETARY COUNSELING: ICD-10-CM

## 2022-08-09 DIAGNOSIS — E66.01 MORBID OBESITY WITH BMI OF 45.0-49.9, ADULT: ICD-10-CM

## 2022-08-09 PROCEDURE — 97803 MED NUTRITION INDIV SUBSEQ: CPT | Mod: 95,,, | Performed by: DIETITIAN, REGISTERED

## 2022-08-09 PROCEDURE — 97803 PR MED NUTR THER, SUBSQ, INDIV, EA 15 MIN: ICD-10-PCS | Mod: 95,,, | Performed by: DIETITIAN, REGISTERED

## 2022-08-09 PROCEDURE — 99499 NO LOS: ICD-10-PCS | Mod: 95,,, | Performed by: DIETITIAN, REGISTERED

## 2022-08-09 PROCEDURE — 99499 UNLISTED E&M SERVICE: CPT | Mod: 95,,, | Performed by: DIETITIAN, REGISTERED

## 2022-08-17 ENCOUNTER — TELEPHONE (OUTPATIENT)
Dept: BARIATRICS | Facility: CLINIC | Age: 47
End: 2022-08-17

## 2022-08-17 NOTE — TELEPHONE ENCOUNTER
----- Message from Jo Fregoso sent at 8/16/2022 11:59 AM CDT -----  Regarding: Pt advice  Contact: pt @ [phone]  Pt is requesting a call back , regarding her surgery  . Please call to discuss further

## 2022-08-17 NOTE — PROGRESS NOTES
The patient location is: work ([place])  The chief complaint leading to consultation is: morbid obesity s/p bariatric sx    Visit type: audiovisual    Face to Face time with patient: 15 min  15 minutes of total time spent on the encounter, which includes face to face time and non-face to face time preparing to see the patient (eg, review of tests), Obtaining and/or reviewing separately obtained history, Documenting clinical information in the electronic or other health record, Independently interpreting results (not separately reported) and communicating results to the patient/family/caregiver, or Care coordination (not separately reported).         Each patient to whom he or she provides medical services by telemedicine is:  (1) informed of the relationship between the physician and patient and the respective role of any other health care provider with respect to management of the patient; and (2) notified that he or she may decline to receive medical services by telemedicine and may withdraw from such care at any time.      NUTRITIONAL Note     Referring Physician: Colt Killian M.D.   Reason for MNT Referral: Follow up assessment for laparoscopic Jm-en-Y Revision work-up    Patient presents with weight stable the past month. States she has started getting back on track with bariatric diet plan.          Past Medical History:   Diagnosis Date    Hypertension           CLINICAL DATA:  46 y.o.-year-old Black or  female.  Height: 5 ft. 9 in.  Weight: 298 lbs  TWL: 102 lbs  IBW: 153 lbs  BMI: 44.1 (from 59)  EWL: 41%     DAILY NUTRITIONAL NEEDS: pre-op nutritional bariatric guidelines to promote weight loss  800-1000 Calories    Grams Protein     NUTRITION & HEALTH HISTORY:  Greatest challenge: dining out frequency, sweets, starchy CHO, portion control, snacking at night, irregular meal patterns, emotional eating and high-fat diet     Current diet recall:      B: none  L: protein shake  3pm:  [employer] 6in on flatbread OR salad with turkey  Sn: fruit or bag of chips  D: grilled chicken/fish, scoop of red beans with no rice     Current Diet:  Meal pattern: 3 meals + 1-2 snacks  Protein supplements: 1-2/week - SK Slim and Trim strawberry + fiber and probiotic. Protein powder whey + 2% milk  Snacking: Reduced junk foods, sweets  Vegetables: Likes a variety. Eats almost daily.  Fruits: Likes a variety. Eats 2-3 times per week.  Beverages: water, sf beverages. 2% milk.  Dining out: Reduced lately. Few times Weekly. Mostly fast food, restaurants and take-out. Subway 6in on flat bread OR salads  Cooking at home: Weekly. Monthly. Mostly baked, grilled, smothered and meat, fish, starchy CHO and vegetables. baked chicken/fish, red beans      Exercise:  None in addition to work. States work is a lot of moving around.  Restrictions to exercise: none     Vitamins / Minerals / Herbs:   Vit D Rx  Iron pills occ - constipation     Labs:   Reviewed.      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  Lives with her  and 3 kids.  Grocery shopping and food prep done by the pt.  Patient believes the household will be supportive after surgery.  Alcohol: Socially.  Smoking: None.     ASSESSMENT:  Patient demonstrates willingness to change lifestyle and make behavior modifications.           Barriers to Education: none     Stage of change: action     NUTRITION DIAGNOSIS:    Morbid Obesity related to Excessive carbohydrate intake, Excessive calorie intake and Physical inactivity as evidence by BMI.     BARIATRIC DIET DISCUSSION/PLAN:  Discussed diet after surgery and related to patient's food record.  Reviewed nutrition guidelines for before and after surgery.  Answered all questions.  Continue to review Bariatric Nutrition Guidebook at home and call with any questions.  Work on Bariatric Nutrition  Checklist.  Work on expanding variety of vegetables.  Work on gradually cutting back on starchy CHO in the diet.  Begin trying various protein supplements to determine preference.  1200-calorie diet.  1500-calorie diet.  5-6 meals per day.  Start including protein supplements in the diet plan daily.  Reduce frequency of dining out.  More grocery shopping and meal preparation at home.  Increase exercise.     RECOMMENDATIONS:  Back on track with Bariatric diet plan, exercise and vit/min regimen.     Patient is a potential candidate for bariatric surgery - Bypass Revision.     Follow up in one month.  Needs additional visit(s) with RD for revision protocol.     Patient verbalized understanding.     Expect fair compliance after surgery at this time.     Communicated nutrition plan with bariatric team.     SESSION TIME:  15 minutes

## 2022-09-02 ENCOUNTER — TELEPHONE (OUTPATIENT)
Dept: BARIATRICS | Facility: CLINIC | Age: 47
End: 2022-09-02
Payer: COMMERCIAL

## 2022-09-02 NOTE — TELEPHONE ENCOUNTER
----- Message from Colt Killian MD sent at 6/30/2022  3:05 PM CDT -----  From this standpoint she can have revision for weight loss.

## 2022-09-02 NOTE — TELEPHONE ENCOUNTER
Patient stated that she have not completed psych appointment. Provided patient with phone number, patient stated that she will follow up after appointment.

## 2022-09-07 ENCOUNTER — PATIENT MESSAGE (OUTPATIENT)
Dept: BARIATRICS | Facility: CLINIC | Age: 47
End: 2022-09-07
Payer: COMMERCIAL

## 2022-10-06 ENCOUNTER — PATIENT MESSAGE (OUTPATIENT)
Dept: BARIATRICS | Facility: CLINIC | Age: 47
End: 2022-10-06
Payer: COMMERCIAL

## 2022-10-19 ENCOUNTER — OFFICE VISIT (OUTPATIENT)
Dept: PSYCHIATRY | Facility: CLINIC | Age: 47
End: 2022-10-19
Payer: COMMERCIAL

## 2022-10-19 DIAGNOSIS — E66.01 MORBID OBESITY: ICD-10-CM

## 2022-10-19 DIAGNOSIS — Z98.0 S/P BYPASS GASTROENTEROSTOMY: ICD-10-CM

## 2022-10-19 DIAGNOSIS — Z01.818 PREOPERATIVE TESTING: Primary | ICD-10-CM

## 2022-10-19 PROCEDURE — 96131 PR PSYCHOLOGIC TEST EVAL SVCS, EA ADDTL HR: ICD-10-PCS | Mod: 95,S$GLB,, | Performed by: PSYCHOLOGIST

## 2022-10-19 PROCEDURE — 3044F PR MOST RECENT HEMOGLOBIN A1C LEVEL <7.0%: ICD-10-PCS | Mod: CPTII,95,, | Performed by: PSYCHOLOGIST

## 2022-10-19 PROCEDURE — 96131 PSYCL TST EVAL PHYS/QHP EA: CPT | Mod: 95,S$GLB,, | Performed by: PSYCHOLOGIST

## 2022-10-19 PROCEDURE — 96130 PSYCL TST EVAL PHYS/QHP 1ST: CPT | Mod: 95,S$GLB,, | Performed by: PSYCHOLOGIST

## 2022-10-19 PROCEDURE — 3044F HG A1C LEVEL LT 7.0%: CPT | Mod: CPTII,95,, | Performed by: PSYCHOLOGIST

## 2022-10-19 PROCEDURE — 99999 PR PBB SHADOW E&M-EST. PATIENT-LVL II: CPT | Mod: PBBFAC,,, | Performed by: PSYCHOLOGIST

## 2022-10-19 PROCEDURE — 96130 PR PSYCHOLOGIC TEST EVAL SVCS, 1ST HR: ICD-10-PCS | Mod: 95,S$GLB,, | Performed by: PSYCHOLOGIST

## 2022-10-19 PROCEDURE — 96138 PR PSYCH/NEUROPSYCH TEST ADMIN/SCORING, BY TECH, 2+ TESTS, 1ST 30 MIN: ICD-10-PCS | Mod: 95,S$GLB,, | Performed by: PSYCHOLOGIST

## 2022-10-19 PROCEDURE — 96139 PSYCL/NRPSYC TST TECH EA: CPT | Mod: 95,S$GLB,, | Performed by: PSYCHOLOGIST

## 2022-10-19 PROCEDURE — 90791 PR PSYCHIATRIC DIAGNOSTIC EVALUATION: ICD-10-PCS | Mod: 95,S$GLB,, | Performed by: PSYCHOLOGIST

## 2022-10-19 PROCEDURE — 4010F PR ACE/ARB THEARPY RXD/TAKEN: ICD-10-PCS | Mod: CPTII,95,, | Performed by: PSYCHOLOGIST

## 2022-10-19 PROCEDURE — 96138 PSYCL/NRPSYC TECH 1ST: CPT | Mod: 95,S$GLB,, | Performed by: PSYCHOLOGIST

## 2022-10-19 PROCEDURE — 90791 PSYCH DIAGNOSTIC EVALUATION: CPT | Mod: 95,S$GLB,, | Performed by: PSYCHOLOGIST

## 2022-10-19 PROCEDURE — 99999 PR PBB SHADOW E&M-EST. PATIENT-LVL II: ICD-10-PCS | Mod: PBBFAC,,, | Performed by: PSYCHOLOGIST

## 2022-10-19 PROCEDURE — 96139 PR PSYCH/NEUROPSYCH TEST ADMIN/SCORING, BY TECH, 2+ TESTS, EA ADDTL 30 MIN: ICD-10-PCS | Mod: 95,S$GLB,, | Performed by: PSYCHOLOGIST

## 2022-10-19 PROCEDURE — 4010F ACE/ARB THERAPY RXD/TAKEN: CPT | Mod: CPTII,95,, | Performed by: PSYCHOLOGIST

## 2022-10-19 NOTE — PSYCH TESTING
[facility]  [address] [place]  [address]  [phone]     REPORT OF PSYCHOLOGICAL TESTING     NAME: Radha Francis  MRN #: [id number]  : 1975     REFERRED BY: Colt Killian M.D.    EVALUATED BY:  Augustina Del Valle, Ph.D., Clinical Psychologist  CINTIA Garcia, Psychometrician     DATES OF EVALUATION: 10/5/22, 10/18/22     EVALUATION PROCEDURES AND TIMES:  Conducted by Psychologist (2 hours):  Integration of patient data, interpretation of standardized test results and clinical data, clinical decision-making, treatment planning and report, and interactive feedback to the patient  CPT Codes:  23514 - 1 hour; 00293 - 1 hour    Conducted by Technician (1 hour, 30 minutes):  Psychological test administration and scoring by technician, two or more tests, any method:  [place] Multiphasic Personality Inventory - 2 - Restructured Form (MMPI-2-RF); [facility] Behavioral Medicine Diagnostic (MBMD)  CPT Codes:  57750 - 30 minutes; 79459 - 30 minutes, 04078 - 30 minutes (2 additional units)     EVALUATION FINDINGS:  Before this evaluation was initiated, the purposes and process of the assessment and the limits of confidentiality were discussed with the patient who expressed understanding of these issues and orally consented to proceed with the evaluation.     Ms. Francis has struggled with weight for much of her life. She is s/p gastric bypass in  by Dr. Killian. She reports losing over 100 pounds but is hoping to be able to lose more. She reported she has been off and on with the diet, resulting in weight regain, although she is still weighing about 100 pounds less than when she had her surgery. She denied a history of emotional eating.  She has tried many weight loss methods on her own (i.e., eating salads, increased exercise) with little success, and she believes that her biggest weight loss challenge is not seeing results fast enough.  Her motivation for seeking surgery now  is to improve her health.    Ms. Francis has no history of significant psychological difficulties. She has never taken psychotropic medication, has never been hospitalized for psychiatric reasons and denied any history of suicidality. She denies a history of eating disorder.     At her initial consultation, her BMI was 45. Her medical comorbidities include: none. She has met with a bariatric dietician, and reports to have made small lifestyle changes since these meetings. She was aware of details regarding the revision procedure, as well as risks of the procedures and post-operative eating restrictions. She appears motivated for change at this time. She was fully cooperative and engaged in the assessment process.     TEST DATA:  Ms. Francis produced a valid and interpretable MMPI-2RF protocol. Her test results indicate she attempted to place herself in an overly positive light by minimizing faults and denying psychological problems.  Of note, this profile is often seen in settings where testing is used to evaluate fitness for desired medical procedures. Her scores do not indicate any somatic, cognitive, emotional, thought, interpersonal, or behavioral dysfunction. However, because of indications of under-reporting described earlier, such problems cannot be ruled out. There are no specific treatment recommendations indicated by her profile.     Ms. Francis also produced a valid and interpretable MBMD protocol. She is not reporting any significant psychiatric symptoms at this time. Regarding coping styles, she is likely easygoing and able to cope well with stress. She reports many stress-moderating assets, including a strong desire to maintain her functional independence, good pain tolerance, and an optimistic view of treatment outcomes. She is uncomfortable knowing all of the details of her treatment plan, but overall appears capable of following recommendations and handling the stress that can accompany a medical  procedure. Test results reveal that, compared to other bariatric surgery patients, there is a high chance of her changing her unhealthy habits and a good chance that surgery will improve her overall quality of life.      DIAGNOSTIC IMPRESSIONS:       ICD-10-CM ICD-9-CM   1. Preoperative testing  Z01.818 V72.84   2. S/P bypass gastroenterostomy  Z98.0 V45.3   3. Morbid obesity  E66.01 278.01   4. BMI 45.0-49.9, adult  Z68.42 V85.42       SUMMARY AND RECOMMENDATIONS:  Ms. Francis has a long history of weight problems and is pursuing bariatric surgery at this time in an effort to improve her health and quality of life. Results of personality testing should be considered valid, and they indicate that she is experiencing no acute psychiatric symptoms or declines in functioning at this time. Test results do not reveal any evidence that psychological difficulties would play a role in her recovery from surgery. No overt psychological contraindications were revealed by psychological testing.

## 2022-10-19 NOTE — PROGRESS NOTES
Psychiatry Initial Visit (PhD/LCSW)   Diagnostic Interview - CPT 75282     Date: 10/19/2022    Site:  The patient location is: [place], [place]  The chief complaint leading to consultation is: presurgical eval    Visit type: audiovisual    Face to Face time with patient: 40  90 minutes of total time spent on the encounter, which includes face to face time and non-face to face time preparing to see the patient (eg, review of tests), Obtaining and/or reviewing separately obtained history, Documenting clinical information in the electronic or other health record, Independently interpreting results (not separately reported) and communicating results to the patient/family/caregiver, or Care coordination (not separately reported).       Each patient to whom he or she provides medical services by telemedicine is:  (1) informed of the relationship between the physician and patient and the respective role of any other health care provider with respect to management of the patient; and (2) notified that he or she may decline to receive medical services by telemedicine and may withdraw from such care at any time.    Notes:       Referral source:  Colt Killian M.D.      Clinical status of patient: Outpatient     Radha Francis, a 47-year-old female, presented for initial evaluation visit. Before this evaluation was initiated, the purposes and process of the assessment and the limits of confidentiality were discussed with the patient who expressed understanding of these issues and orally consented to proceed with the evaluation.     Chief complaint/reason for encounter: Routine psychological evaluation prior to bariatric surgery.     Type of surgery sought: RNY Bypass revision    History of present illness:   Ms. Francis is a 47-year-old Black female who is pursuing bariatric surgery to improve her health and quality of life. She has no history of significant psychological difficulties. She has never taken psychotropic  medication, has never been hospitalized for psychiatric reasons and denied any history of suicidality.  She has begun making positive lifestyle changes in anticipation for surgery, with good benefit. The patient has a Body Mass Index of 45 as documented by the referring provider.    Ms. Francis has struggled with weight for much of her life. She is s/p gastric bypass in 2007 by Dr. Killian. She reports losing over 100 pounds but is hoping to be able to lose more. She reported she has been off and on with the diet, resulting in weight regain, although she is still weighing about 100 pounds less than when she had her surgery. She denied a history of emotional eating.  She has tried many weight loss methods on her own (i.e., eating salads, increased exercise) with little success, and she believes that her biggest weight loss challenge is not seeing results fast enough.  Her motivation for seeking surgery now is to improve her health.     Ms. Francis has met with Ms. Carmen RD, bariatric dietician, and reports that she demonstrated knowledge of healthy eating behaviors and exercise patterns but admits to not eating healthy and not exercising at this point. She must continue meeting with Ms. Morales to demonstrate the implementation of lifestyle changes prior to clearance for bariatric surgery.    Co-morbidities: none noted    Knowledge of surgery information:  - Basics of procedure: make the pouch smaller  - Risks: leakage, dumping syndrome  - Basics of diet: liquid diet, take vitamins, high portein    Psychiatric Symptoms:   Depression -Denied depressed mood, loss of interest in pleasurable activities, anhedonia, sleep changes, decreased motivation, decreased concentration, feelings of excessive or irrational guilt, helplessness, hopelessness, increased or decreased appetite, weight changes, increased or decreased motor activity, decreased energy, suicidal ideations/thoughts of death.  Patti/Hypomania -Denied  increased goal directed activity, decreased need for sleep, pressured speech or increased talkative, racing thoughts, increased risk-taking behavior, episodic elevated or irritable mood, flight of ideas, distractibility, inflated self-esteem, grandiosity  Anxiety - Denied excessive worry, difficulty controlling worrying, feeling keyed up, easily fatigued, difficulty concentrating or mind going blank, irritability, muscle tension, sleep disturbance, racing thoughts, being unable to relax, specific phobia.  Panic Attacks: Denied palpitations, sweating, trembling, dyspnea, choking sensation, chest pain/discomfort, nausea, dizziness, chills or hot flashes, tingling, derealization, fear of losing control, fear of dying.  Thoughts - Denied any AVH, paranoia, delusions, ideas of reference, thought insertion or thought broadcasting  Suicidal thoughts/behaviors - denied passive or active SI, denied suicidal plans or intent  Self-injury - denied.  Substance abuse - denied abuse or dependence.   Sleep -Denied increased sleep latency, frequent nighttime awakenings, or early morning awakening with inability to return to sleep.    Current psychiatric treatment:  Medications: Denied     Psychotherapy: None    Treating clinicians: None    Health behaviors: Reported adherence to pharmacologic regimen.      Psychiatric history:   Previous diagnosis:  None    Previous hospitalizations: none     History of outpatient treatment: none    Previous suicide attempt: none    Trauma history:  Denies.    PTSD: Denies re-experiencing trauma, nightmares, increased awareness of surroundings, hyperexcitability.    History of eating disorders:  History of bulimia:  She denied recurrent episodes of eating then engaging in inappropriate compensatory behaviors.      History of binge-eating episodes:  She denied eating excessive amounts of food within a discrete time period with a lack of control during eating.  She denied eating more rapidly than  normal, eating until uncomfortably full, eating large amounts of food when not physically hungry, eating alone due to embarrassment, or negative emotions (i.e., disgusted, guilty, depressed) afterwards.    Family history of psychiatric illness: None reported.     Social history (marriage, employment, etc.): Ms. Francis was born and raised in [place], [place] by her biological parents. She described her childhood as average. She denied childhood trauma, abuse, and neglect. She has a high school education.  She is currently a  for [employer].  She is not on disability. She has been  for 28 years.  She has 4 children (ages 27, 25, 24 and 9).  She currently lives with three of her children and her .  She identifies as Yazidism.    Current psychosocial stressors: none    Report of coping skills: talk to , take a nap, pray    Support system: , family    Substance use:   Alcohol:  None  Drugs: Denied current use; denied history of abuse or dependency.  Tobacco: None.   Caffeine:  None, recently cut out all sodas    Current medications and drug reactions (include OTC, herbal): see medication list     Current Evaluation:    Mental Status Exam:   General Appearance:  age appropriate, well dressed, neatly groomed, overweight    Speech:  normal tone, normal rate, normal pitch, normal volume    Level of Cooperation:  cooperative    Thought Processes:  normal and logical    Mood:  euthymic    Thought Content:  normal, no suicidality, no homicidality, delusions, or paranoia    Affect:  congruent and appropriate    Orientation:  oriented x3    Memory:  recent memory intact; immediate and delayed word recall 3/3  remote memory intact; able to recall remote personal events   Attention Span & Concentration:  appropriate   Fund of General Knowledge:  appropriate for education    Abstract Reasoning:  Not directly assessed   Judgment & Insight:  good    Language  intact        Diagnostic Impression - Plan:       Diagnostic Impression:    ICD-10-CM ICD-9-CM   1. Preoperative testing  Z01.818 V72.84   2. S/P bypass gastroenterostomy  Z98.0 V45.3   3. Morbid obesity  E66.01 278.01   4. BMI 45.0-49.9, adult  Z68.42 V85.42       Summary/Conclusion:   There are no overt psychological contraindications for proceeding with bariatric surgery.  The patient has no significant psychiatric history and reports no current psychiatric problems or major adjustment issues.  The patient has reasonable expectations for surgery, good social support, and has already begun making appropriate lifestyle changes in anticipation for surgery. The patient has verbalized appropriate awareness and commitment to the necessary behavioral changes associated with bariatric surgery and appears willing to comply with long-term lifestyle changes.     Recommendations:  -This patient is psychologically cleared to proceed with bariatric surgery.  - There are no recommendations for psychological treatment at this time. The patient is aware of resources available should her needs change in the future.      Please see Psychological Testing report available in Notes tab of Chart Review in Epic for results of psychological testing.

## 2022-10-26 ENCOUNTER — PATIENT MESSAGE (OUTPATIENT)
Dept: BARIATRICS | Facility: CLINIC | Age: 47
End: 2022-10-26
Payer: COMMERCIAL

## 2022-10-26 ENCOUNTER — TELEPHONE (OUTPATIENT)
Dept: BARIATRICS | Facility: CLINIC | Age: 47
End: 2022-10-26
Payer: COMMERCIAL

## 2022-10-26 NOTE — TELEPHONE ENCOUNTER
Called and spoke to pt. Scheduled virtual diet visit for tomorrow. Sent portal message to pt with bariatric seminar instructions. Pt stated she believed she completed all other requirements. Pt stated she thought she had completed Fl UGI, but did not see the results or completion in chart. Sent message to TERE Peacock to review pt chart and f/u.

## 2022-10-26 NOTE — TELEPHONE ENCOUNTER
----- Message from Yanely Padilla sent at 10/26/2022 12:21 PM CDT -----  MARIA ALEJANDRA HALL calling regarding scheduling surgery date.  call back [phone]  pt have complete all the necessary steps.

## 2022-10-27 ENCOUNTER — CLINICAL SUPPORT (OUTPATIENT)
Dept: BARIATRICS | Facility: CLINIC | Age: 47
End: 2022-10-27
Payer: COMMERCIAL

## 2022-10-27 DIAGNOSIS — Z98.84 S/P BARIATRIC SURGERY: ICD-10-CM

## 2022-10-27 DIAGNOSIS — Z71.3 DIETARY COUNSELING: Primary | ICD-10-CM

## 2022-10-27 DIAGNOSIS — E66.01 MORBID OBESITY WITH BMI OF 40.0-44.9, ADULT: ICD-10-CM

## 2022-10-27 DIAGNOSIS — E66.01 MORBID OBESITY WITH BMI OF 45.0-49.9, ADULT: ICD-10-CM

## 2022-10-27 PROCEDURE — 99499 NO LOS: ICD-10-PCS | Mod: 95,,, | Performed by: DIETITIAN, REGISTERED

## 2022-10-27 PROCEDURE — 97803 MED NUTRITION INDIV SUBSEQ: CPT | Mod: 95,,, | Performed by: DIETITIAN, REGISTERED

## 2022-10-27 PROCEDURE — 99499 UNLISTED E&M SERVICE: CPT | Mod: 95,,, | Performed by: DIETITIAN, REGISTERED

## 2022-10-27 PROCEDURE — 97803 PR MED NUTR THER, SUBSQ, INDIV, EA 15 MIN: ICD-10-PCS | Mod: 95,,, | Performed by: DIETITIAN, REGISTERED

## 2022-10-27 NOTE — PROGRESS NOTES
The patient location is: home ([place])  The chief complaint leading to consultation is: morbid obesity    Visit type: audiovisual    Face to Face time with patient: 15 min  15 minutes of total time spent on the encounter, which includes face to face time and non-face to face time preparing to see the patient (eg, review of tests), Obtaining and/or reviewing separately obtained history, Documenting clinical information in the electronic or other health record, Independently interpreting results (not separately reported) and communicating results to the patient/family/caregiver, or Care coordination (not separately reported).         Each patient to whom he or she provides medical services by telemedicine is:  (1) informed of the relationship between the physician and patient and the respective role of any other health care provider with respect to management of the patient; and (2) notified that he or she may decline to receive medical services by telemedicine and may withdraw from such care at any time.    NUTRITIONAL Note     Referring Physician: Colt Killian M.D.   Reason for MNT Referral: Follow up assessment for laparoscopic Jm-en-Y Revision work-up     Patient presents with 8 lbs weight loss the past 3 months. States she has been doing well getting back on track with bariatric diet plan in preparation for Revision.             Past Medical History:   Diagnosis Date    Hypertension           CLINICAL DATA:  47 y.o.-year-old Black or  female.  Height: 5 ft. 9 in.  Weight: 290 lbs  TWL: 110 lbs  IBW: 153 lbs  BMI: 42.8 (from 59)  EWL: 42%     DAILY NUTRITIONAL NEEDS: pre-op nutritional bariatric guidelines to promote weight loss  800-1000 Calories    Grams Protein     NUTRITION & HEALTH HISTORY:  Greatest challenge: dining out frequency, sweets, starchy CHO, portion control, snacking at night, irregular meal patterns, emotional eating and high-fat diet     Current diet recall:      B: 2  boiled eggs OR Belvita cookies  L: protein shake  3pm: Subway or Jania's salad with grilled chicken/tuna salad  Sn: 1 cup of grapes or a banana  D: protein shake OR baked chicken/fish + cooked vegetables OR stir roy chicken and asian veggies in olive oil     Current Diet:  Meal pattern: 1-2 meals + 1-2 snacks + 2 protein supplements  Protein supplements: 2/day Premier shakes. SK Slim and Trim strawberry + fiber and probiotic. Protein powder whey + 2% milk  Snacking: Denies junk foods, sweets  Vegetables: Likes a variety. Eats daily.  Fruits: Likes a variety. Eats daily.  Beverages: water, sf beverages. 2% milk.  Dining out: Almost daily for lunch. Mostly fast food, restaurants and take-out. Subway or Jania's salads  Cooking at home: Weekly. Monthly. Mostly baked, grilled, smothered and meat, fish, (limited starchy CHO) and vegetables. baked chicken/fish, red beans      Exercise:  None in addition to work. States work is a lot of moving around.  Restrictions to exercise: none     Vitamins / Minerals / Herbs:   MV with Iron  Iron pills occ - constipation  Vit D Rx  Ca citrate +D  Vit B12 sub 5000mcg every 2 weeks     Labs:   Reviewed.      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  Lives with her  and 3 kids.  Grocery shopping and food prep done by the pt.  Patient believes the household will be supportive after surgery.  Alcohol: Socially.  Smoking: None.     ASSESSMENT:  Patient demonstrates willingness to change lifestyle and make behavior modifications AEB weight loss, dietary changes, protein supplements.           Barriers to Education: none     Stage of change: action     NUTRITION DIAGNOSIS:    Morbid Obesity related to Excessive carbohydrate intake, Excessive calorie intake and Physical inactivity as evidence by BMI.  Status: Improved     BARIATRIC DIET DISCUSSION/PLAN:  Discussed  diet after surgery and related to patient's food record.  Reviewed nutrition guidelines for before and after surgery.  Answered all questions.  Continue to review Bariatric Nutrition Guidebook at home and call with any questions.  Stick with protein in the morning instead of the Belvita cookies.     RECOMMENDATIONS:  Good candidate for bariatric surgery - Bypass revision     Patient verbalized understanding.     Expect good compliance after surgery at this time.    Will f/u before/after surgery as needed.     Communicated nutrition plan with bariatric team.     SESSION TIME:  15 minutes

## 2022-11-02 ENCOUNTER — TELEPHONE (OUTPATIENT)
Dept: BARIATRICS | Facility: CLINIC | Age: 47
End: 2022-11-02
Payer: COMMERCIAL

## 2022-11-02 NOTE — TELEPHONE ENCOUNTER
----- Message from Augustina Del Valle, PhD sent at 10/19/2022 12:33 PM CDT -----  Patient is psychologically cleared for bariatric surgery. No recommendations made.

## 2022-11-04 ENCOUNTER — TELEPHONE (OUTPATIENT)
Dept: BARIATRICS | Facility: CLINIC | Age: 47
End: 2022-11-04
Payer: COMMERCIAL

## 2022-11-04 ENCOUNTER — PATIENT MESSAGE (OUTPATIENT)
Dept: BARIATRICS | Facility: CLINIC | Age: 47
End: 2022-11-04
Payer: COMMERCIAL

## 2022-11-04 DIAGNOSIS — E66.01 MORBID OBESITY WITH BMI OF 45.0-49.9, ADULT: Primary | ICD-10-CM

## 2022-11-04 DIAGNOSIS — Z98.0 S/P BYPASS GASTROENTEROSTOMY: ICD-10-CM

## 2022-11-04 NOTE — TELEPHONE ENCOUNTER
Called and LVM for RC to discuss scheduling surgery. Informed pt in message she would need a Fl UGI prior to surgery and Dr. Killian's schedule is booked until the end of the year. Told pt in message she can go with another surgeon or wait to schedule her surgery until the new year. Clinic phone number provided to pt in message.

## 2022-11-04 NOTE — TELEPHONE ENCOUNTER
"Spoke with patient and confirmed the surgical procedure of LRNY Revision with Dr Silverio on 12/22/22.  Scheduled preop appts/surgery date/2 week and 8 week post op appts. All dates and times agreed upon. Pt aware that if they are required to have PCP clearance, it must be within 6 months of surgery, unless their medical history has changed, it should be dated, signed and in chart for preop appointment. All medications have been reviewed regarding the necessity to be crushed or broken into pieces smaller that the tip of a pencil eraser for 2 weeks following gastric sleeve surgery and 4 weeks following gastric bypass surgery. Pt instructed to stop taking all NSAIDS 1 week before surgery and for life after surgery. Pt aware that protein liquid diet start date is 12/8/22. Patient is doing well with their diet. Patient was instructed about the progression of the diet phases. The patient's current weight is 290 lb, height is 5'9", and BMI is 42.8. Refer to medical letter of necessity from Surgeon. Discussed the importance of increased physical activity and dieting lifestyle changes to improve weight loss and meet goals. Screened patient for history of UTI per protocol. Discussed with patient to avoid antibiotics and elective procedures involving sedation/anesthesia within 30 days of surgery unless cleared by the bariatric department. Patient instructed to call the bariatric clinic post op for any s/s of UTI. Patient's mailing address confirmed and informed to expect a manilla envelop containing bariatric surgery pre op booklet, appointment reminders, protein and fluid log sheets, and liquid diet and vitamin information sheets. Pt aware that all appts can be seen in my [facility] patient portal at this time. Confirmed email address and informed patient that they will be enrolled in the Patient Reported Outcomes program to track their progress and successes. The first email will be sent 2-3 weeks before surgery and then every " year on your surgery anniversary date. Office phone and fax number given to patient for any future questions/concerns. Discussed the pre-surgery complex carbohydrate beverage to purchase in [facility] pharmacy to drink 30 minutes before the surgery arrival time. Reviewed the policy of scheduling a covid test 72 hours prior to surgery if necessary.

## 2022-11-09 ENCOUNTER — TELEPHONE (OUTPATIENT)
Dept: BARIATRICS | Facility: CLINIC | Age: 47
End: 2022-11-09
Payer: COMMERCIAL

## 2022-11-09 NOTE — TELEPHONE ENCOUNTER
Dr. Silverio requested pt's surgery be moved to Dr. Killian's schedule due to pt's original request and the pt's original surgery completed via Dr. Killian.      Pt's surgery needs to be moved to Dr. Killian for 12/23/2022.  Preop liquid diet start date is 12/9/2022.  Pt's

## 2022-11-10 ENCOUNTER — TELEPHONE (OUTPATIENT)
Dept: BARIATRICS | Facility: CLINIC | Age: 47
End: 2022-11-10
Payer: COMMERCIAL

## 2022-11-10 ENCOUNTER — PATIENT MESSAGE (OUTPATIENT)
Dept: BARIATRICS | Facility: CLINIC | Age: 47
End: 2022-11-10
Payer: COMMERCIAL

## 2022-11-10 NOTE — TELEPHONE ENCOUNTER
Called and spoke to pt to discuss her new surgery date of 12/23/22 with Dr. Killian. Pt agreed to new date. Informed pt of new preop date/time. Pt informed she should be receiving the packet soon. Pt also asked if we could check on [facility] paperwork. Pt attempted to email to [email or web address], received a confirmation, but after speaking to [facility] office, was told they never received. Asked pt to email to me and I would send it to [facility] office. Pt agreed.

## 2022-11-15 ENCOUNTER — PATIENT MESSAGE (OUTPATIENT)
Dept: BARIATRICS | Facility: CLINIC | Age: 47
End: 2022-11-15
Payer: COMMERCIAL

## 2022-11-23 ENCOUNTER — HOSPITAL ENCOUNTER (OUTPATIENT)
Dept: RADIOLOGY | Facility: HOSPITAL | Age: 47
Discharge: HOME | End: 2022-11-23
Attending: SURGERY
Payer: COMMERCIAL

## 2022-11-23 DIAGNOSIS — Z01.818 PREOPERATIVE TESTING: ICD-10-CM

## 2022-11-23 DIAGNOSIS — Z98.0 S/P BYPASS GASTROENTEROSTOMY: ICD-10-CM

## 2022-11-23 PROCEDURE — 74240 X-RAY XM UPR GI TRC 1CNTRST: CPT | Mod: TC

## 2022-11-28 ENCOUNTER — PATIENT MESSAGE (OUTPATIENT)
Dept: BARIATRICS | Facility: CLINIC | Age: 47
End: 2022-11-28
Payer: COMMERCIAL

## 2022-12-06 ENCOUNTER — TELEPHONE (OUTPATIENT)
Dept: BARIATRICS | Facility: CLINIC | Age: 47
End: 2022-12-06
Payer: COMMERCIAL

## 2022-12-06 ENCOUNTER — PATIENT MESSAGE (OUTPATIENT)
Dept: BARIATRICS | Facility: CLINIC | Age: 47
End: 2022-12-06
Payer: COMMERCIAL

## 2022-12-06 NOTE — TELEPHONE ENCOUNTER
Received denial letter from Denise Willoughby with the precert dept. Denial letter     Called and spoke with the pt.  Informed her of the denial letter from her insurance company and requested if she had also received a denial letter. She said that she did not.  I read her a portion of the letter and offered to forward it to her email address.  She verified that the email address on file is correct.  Pt would like to proceed with a peer to peer to be scheduled.  Will update the pt with the date and time and will update her after the peer to peer has been completed.      Pt stated that her a diagnosis of DM was not sent to her insurance company.  Reviewed her medical records on file.  Asked the pt if she was ever diagnosed with DM.  She said no but was referencing her A1C.  Reviewed her A1C result of 5.7 on 5/26/2022.      Pt will contact her insurance company in attempt to receive more information.     Peer to peer scheduled:  12/8/22  12pm  Dr. Stan Carvalho    Insurance fax number for additional documentation: [phone]

## 2022-12-07 ENCOUNTER — PATIENT MESSAGE (OUTPATIENT)
Dept: BARIATRICS | Facility: CLINIC | Age: 47
End: 2022-12-07
Payer: COMMERCIAL

## 2022-12-08 ENCOUNTER — TELEPHONE (OUTPATIENT)
Dept: BARIATRICS | Facility: CLINIC | Age: 47
End: 2022-12-08
Payer: COMMERCIAL

## 2022-12-08 ENCOUNTER — DOCUMENTATION ONLY (OUTPATIENT)
Dept: SURGERY | Facility: CLINIC | Age: 47
End: 2022-12-08
Payer: COMMERCIAL

## 2022-12-08 NOTE — TELEPHONE ENCOUNTER
Peer to peer completed.  Result- still denied. Insurance will not approve for weight regain or HTN.    Called and spoke with the pt.  She stated she is changing insurances and will let us know what the new insurance is.  I provided the insurance verification line.  She requested the expiration dates for her testing which I provided. Pt will inform the bariatric clinic if ready to reschedule surgery with new insurance.     Followed up with portal message.    Informed all necessary related parties.

## 2022-12-08 NOTE — PROGRESS NOTES
47y.o. female presents with s/p gastric bypass by me 2007 for bmi 59 sherrie on cpap and essential htn.  Since surgery she lost and regained weight.     I have reviewed, her ugi and egd.  Her pouch is 6 cm and stoma 2cm.  This is enlarged and a complication that has caused her weight regain.  She needs surgery to make her pouch and anastomosis appropriate size.

## 2023-05-10 ENCOUNTER — TELEPHONE (OUTPATIENT)
Dept: CARDIOLOGY | Facility: HOSPITAL | Age: 48
End: 2023-05-10

## 2023-05-10 ENCOUNTER — HOSPITAL ENCOUNTER (EMERGENCY)
Facility: HOSPITAL | Age: 48
Discharge: HOME | End: 2023-05-10
Attending: EMERGENCY MEDICINE
Payer: COMMERCIAL

## 2023-05-10 VITALS
RESPIRATION RATE: 20 BRPM | BODY MASS INDEX: 43.4 KG/M2 | HEIGHT: 69 IN | SYSTOLIC BLOOD PRESSURE: 159 MMHG | DIASTOLIC BLOOD PRESSURE: 77 MMHG | TEMPERATURE: 98 F | WEIGHT: 293 LBS | OXYGEN SATURATION: 100 % | HEART RATE: 59 BPM

## 2023-05-10 DIAGNOSIS — R07.9 CHEST PAIN: ICD-10-CM

## 2023-05-10 DIAGNOSIS — R07.89 ATYPICAL CHEST PAIN: Primary | ICD-10-CM

## 2023-05-10 LAB
ALBUMIN SERPL BCP-MCNC: 3.6 G/DL (ref 3.5–5.2)
ALP SERPL-CCNC: 113 U/L (ref 55–135)
ALT SERPL W/O P-5'-P-CCNC: 8 U/L (ref 10–44)
ANION GAP SERPL CALC-SCNC: 8 MMOL/L (ref 8–16)
AST SERPL-CCNC: 9 U/L (ref 10–40)
B-HCG UR QL: NEGATIVE
BASOPHILS # BLD AUTO: 0.03 K/UL (ref 0–0.2)
BASOPHILS NFR BLD: 0.5 % (ref 0–1.9)
BILIRUB SERPL-MCNC: 0.3 MG/DL (ref 0.1–1)
BILIRUB UR QL STRIP: NEGATIVE
BUN SERPL-MCNC: 10 MG/DL (ref 6–20)
CALCIUM SERPL-MCNC: 9.1 MG/DL (ref 8.7–10.5)
CHLORIDE SERPL-SCNC: 106 MMOL/L (ref 95–110)
CLARITY UR: CLEAR
CO2 SERPL-SCNC: 25 MMOL/L (ref 23–29)
COLOR UR: YELLOW
CREAT SERPL-MCNC: 0.9 MG/DL (ref 0.5–1.4)
DIFFERENTIAL METHOD: ABNORMAL
EOSINOPHIL # BLD AUTO: 0.1 K/UL (ref 0–0.5)
EOSINOPHIL NFR BLD: 1.4 % (ref 0–8)
ERYTHROCYTE [DISTWIDTH] IN BLOOD BY AUTOMATED COUNT: 20.5 % (ref 11.5–14.5)
EST. GFR  (NO RACE VARIABLE): >60 ML/MIN/1.73 M^2
GLUCOSE SERPL-MCNC: 92 MG/DL (ref 70–110)
GLUCOSE UR QL STRIP: NEGATIVE
HCT VFR BLD AUTO: 33.3 % (ref 37–48.5)
HCV AB SERPL QL IA: REACTIVE
HGB BLD-MCNC: 10.2 G/DL (ref 12–16)
HGB UR QL STRIP: NEGATIVE
HIV 1+2 AB+HIV1 P24 AG SERPL QL IA: NORMAL
IMM GRANULOCYTES # BLD AUTO: 0.02 K/UL (ref 0–0.04)
IMM GRANULOCYTES NFR BLD AUTO: 0.3 % (ref 0–0.5)
KETONES UR QL STRIP: NEGATIVE
LEUKOCYTE ESTERASE UR QL STRIP: NEGATIVE
LIPASE SERPL-CCNC: 20 U/L (ref 4–60)
LYMPHOCYTES # BLD AUTO: 0.8 K/UL (ref 1–4.8)
LYMPHOCYTES NFR BLD: 13.1 % (ref 18–48)
MCH RBC QN AUTO: 21.7 PG (ref 27–31)
MCHC RBC AUTO-ENTMCNC: 30.6 G/DL (ref 32–36)
MCV RBC AUTO: 71 FL (ref 82–98)
MONOCYTES # BLD AUTO: 0.5 K/UL (ref 0.3–1)
MONOCYTES NFR BLD: 7.7 % (ref 4–15)
NEUTROPHILS # BLD AUTO: 4.5 K/UL (ref 1.8–7.7)
NEUTROPHILS NFR BLD: 77 % (ref 38–73)
NITRITE UR QL STRIP: NEGATIVE
NRBC BLD-RTO: 0 /100 WBC
PH UR STRIP: 6 [PH] (ref 5–8)
PLATELET # BLD AUTO: 325 K/UL (ref 150–450)
PMV BLD AUTO: 8.8 FL (ref 9.2–12.9)
POTASSIUM SERPL-SCNC: 3.8 MMOL/L (ref 3.5–5.1)
PROT SERPL-MCNC: 7.3 G/DL (ref 6–8.4)
PROT UR QL STRIP: NEGATIVE
RBC # BLD AUTO: 4.7 M/UL (ref 4–5.4)
SODIUM SERPL-SCNC: 139 MMOL/L (ref 136–145)
SP GR UR STRIP: 1 (ref 1–1.03)
TROPONIN I SERPL DL<=0.01 NG/ML-MCNC: <0.006 NG/ML (ref 0–0.03)
URN SPEC COLLECT METH UR: NORMAL
UROBILINOGEN UR STRIP-ACNC: NEGATIVE EU/DL
WBC # BLD AUTO: 5.81 K/UL (ref 3.9–12.7)

## 2023-05-10 PROCEDURE — 86803 HEPATITIS C AB TEST: CPT | Performed by: EMERGENCY MEDICINE

## 2023-05-10 PROCEDURE — 84484 ASSAY OF TROPONIN QUANT: CPT | Performed by: EMERGENCY MEDICINE

## 2023-05-10 PROCEDURE — 85025 COMPLETE CBC W/AUTO DIFF WBC: CPT | Performed by: EMERGENCY MEDICINE

## 2023-05-10 PROCEDURE — 93010 EKG 12-LEAD: ICD-10-PCS | Mod: ,,, | Performed by: GENERAL PRACTICE

## 2023-05-10 PROCEDURE — 25000003 PHARM REV CODE 250: Performed by: EMERGENCY MEDICINE

## 2023-05-10 PROCEDURE — 81025 URINE PREGNANCY TEST: CPT | Performed by: EMERGENCY MEDICINE

## 2023-05-10 PROCEDURE — 87389 HIV-1 AG W/HIV-1&-2 AB AG IA: CPT | Performed by: EMERGENCY MEDICINE

## 2023-05-10 PROCEDURE — 93005 ELECTROCARDIOGRAM TRACING: CPT

## 2023-05-10 PROCEDURE — 36415 COLL VENOUS BLD VENIPUNCTURE: CPT | Performed by: EMERGENCY MEDICINE

## 2023-05-10 PROCEDURE — 99284 EMERGENCY DEPT VISIT MOD MDM: CPT

## 2023-05-10 PROCEDURE — 93010 ELECTROCARDIOGRAM REPORT: CPT | Mod: ,,, | Performed by: GENERAL PRACTICE

## 2023-05-10 PROCEDURE — 83690 ASSAY OF LIPASE: CPT | Performed by: EMERGENCY MEDICINE

## 2023-05-10 PROCEDURE — 80053 COMPREHEN METABOLIC PANEL: CPT | Performed by: EMERGENCY MEDICINE

## 2023-05-10 PROCEDURE — 81003 URINALYSIS AUTO W/O SCOPE: CPT | Performed by: EMERGENCY MEDICINE

## 2023-05-10 RX ORDER — ALUMINUM HYDROXIDE, MAGNESIUM HYDROXIDE, AND SIMETHICONE 2400; 240; 2400 MG/30ML; MG/30ML; MG/30ML
15 SUSPENSION ORAL EVERY 6 HOURS PRN
Qty: 335 ML | Refills: 0 | Status: ON HOLD | OUTPATIENT
Start: 2023-05-10 | End: 2023-05-14

## 2023-05-10 RX ORDER — MAG HYDROX/ALUMINUM HYD/SIMETH 200-200-20
30 SUSPENSION, ORAL (FINAL DOSE FORM) ORAL
Status: COMPLETED | OUTPATIENT
Start: 2023-05-10 | End: 2023-05-10

## 2023-05-10 RX ADMIN — ALUMINUM HYDROXIDE, MAGNESIUM HYDROXIDE, AND SIMETHICONE 30 ML: 200; 200; 20 SUSPENSION ORAL at 09:05

## 2023-05-10 NOTE — ED PROVIDER NOTES
Encounter Date: 5/10/2023       History     Chief Complaint   Patient presents with    Chest Pain     Chest pain x 3 days mainly at night and stomach burning, stated that the burning begins and then the pains begin after states she has been taking reflux medication but its not helping enough to stop the pain      HPI 47-year-old woman who presents emergency department complaining of intermittent chest pain for the past 3 days.  The pain mainly occurs at night and wakes her up through the middle of the night.  Is associated with epigastric burning that becomes relieved with antacid medication however the chest pain persists lasting about 20 minutes.  There is no associated shortness of breath, diaphoresis, or any other complaints.  History of gastric bypass in 2007.  Cardiac risk factors include obesity and hypertension.  Review of patient's allergies indicates:  No Known Allergies  Past Medical History:   Diagnosis Date    Hypertension      Past Surgical History:   Procedure Laterality Date    ESOPHAGOGASTRODUODENOSCOPY N/A 6/30/2022    Procedure: EGD (ESOPHAGOGASTRODUODENOSCOPY);  Surgeon: Sadia Ovalles MD;  Location: [address];  Service: Endoscopy;  Laterality: N/A;  fully vaccinated.EC    GASTRIC BYPASS      2007     Family History   Problem Relation Age of Onset    Cancer Mother      Social History     Tobacco Use    Smoking status: Never    Smokeless tobacco: Never   Substance Use Topics    Alcohol use: Yes     Comment: occasionally    Drug use: Never     Review of Systems   Constitutional:  Negative for fever.   HENT:  Negative for sore throat.    Respiratory:  Negative for shortness of breath.    Cardiovascular:  Positive for chest pain.   Gastrointestinal:  Negative for nausea.   Genitourinary:  Negative for dysuria.   Musculoskeletal:  Negative for back pain.   Skin:  Negative for rash.   Neurological:  Negative for weakness.   Hematological:  Does not bruise/bleed easily.     Physical Exam      Initial Vitals [05/10/23 0855]   BP Pulse Resp Temp SpO2   (!) 169/96 67 20 97.9 °F (36.6 °C) 100 %      MAP       --         Physical Exam    Constitutional: Vital signs are normal. She appears well-developed and well-nourished. She is Obese .  Non-toxic appearance. No distress.   HENT:   Head: Normocephalic and atraumatic.   Eyes: EOM are normal. Pupils are equal, round, and reactive to light.   Neck: Neck supple. No JVD present.   Normal range of motion.  Cardiovascular:  Normal rate, regular rhythm, normal heart sounds and intact distal pulses.     Exam reveals no gallop and no friction rub.       No murmur heard.  Pulmonary/Chest: Breath sounds normal. She has no wheezes. She has no rhonchi. She has no rales.   Abdominal: Abdomen is soft. Bowel sounds are normal. There is no abdominal tenderness. There is no rebound and no guarding.   Musculoskeletal:         General: Normal range of motion.      Cervical back: Normal range of motion and neck supple. No rigidity.     Neurological: She is alert and oriented to person, place, and time. She has normal strength and normal reflexes. No cranial nerve deficit or sensory deficit. She exhibits normal muscle tone. Coordination normal. GCS eye subscore is 4. GCS verbal subscore is 5. GCS motor subscore is 6.   Skin: Skin is warm and dry.   Psychiatric: She has a normal mood and affect. Her speech is normal and behavior is normal. She is not actively hallucinating.       ED Course   Procedures  Labs Reviewed   HEPATITIS C ANTIBODY - Abnormal; Notable for the following components:       Result Value    Hepatitis C Ab Reactive (*)     All other components within normal limits    Narrative:     Release to patient->Immediate   CBC W/ AUTO DIFFERENTIAL - Abnormal; Notable for the following components:    Hemoglobin 10.2 (*)     Hematocrit 33.3 (*)     MCV 71 (*)     MCH 21.7 (*)     MCHC 30.6 (*)     RDW 20.5 (*)     MPV 8.8 (*)     Lymph # 0.8 (*)     Gran % 77.0 (*)      Lymph % 13.1 (*)     All other components within normal limits    Narrative:     Release to patient->Immediate   COMPREHENSIVE METABOLIC PANEL - Abnormal; Notable for the following components:    AST 9 (*)     ALT 8 (*)     All other components within normal limits    Narrative:     Release to patient->Immediate   HIV 1 / 2 ANTIBODY    Narrative:     Release to patient->Immediate   LIPASE    Narrative:     Release to patient->Immediate   URINALYSIS, REFLEX TO URINE CULTURE    Narrative:     Specimen Source->Urine   TROPONIN I    Narrative:     Release to patient->Immediate   PREGNANCY TEST, URINE RAPID    Narrative:     Specimen Source->Urine     EKG Readings: (Independently Interpreted)   Normal sinus rhythm, 63 beats per minute with moderate voltage criteria for LVH, nonspecific T-wave abnormality and slightly prolonged QT of 452 milliseconds.  No significant change when compared to previous EKG May 26, 2022.     Imaging Results    None          Medications   aluminum-magnesium hydroxide-simethicone 200-200-20 mg/5 mL suspension 30 mL (30 mLs Oral Given 5/10/23 0943)     Medical Decision Making:   History:   Old Medical Records: I decided to obtain old medical records.  Initial Assessment:   This is an emergent evaluation for a patient with chest pain. The patient is complaining of chest pain for the past 3 days.The patient's pain is atypical for cardiac etiology.  I decided to obtain and review the patient's past medical record.        The vital signs are stable in the room.    EKG is normal.  There is no evidence of STEMI or ischemia.    CXR is interpreted by myself and negative for pneumonia, pneumothorax and edema.  Troponin is negative, less than 0.006 and was drawn at least 8 hours since the onset of pain.  I doubt ACS.  There is no evidence of congestive heart failure.  The electrolytes are relatively normal.  Sodium 139, potassium 3.8, calcium 9.1  The pt is mildly anemic, hemoglobin 10.2.  PERC negative.   I doubt PE.     Currently the patient has a a non-diagnostic EKG with negative troponin in the emergency department.  I doubt acute coronary syndrome.  I did inform the patient that even with negative testing, we can never eliminate all risk.  I believe the patient is low risk with negative initial testing; they are appropriate for close outpatient follow-up, possible stress test.  The patient is aware of the small but persistent risk for MI/ACS with subsequent cardiac complications or death.  I have low suspicion for cardiopulmonary, vascular, infectious, respiratory, or other emergent medical condition based on my evaluation in the ED.     The patient's pain is currently improved.      The results and physical exam findings were reviewed with the patient. Pt agrees with assessment, disposition and treatment plan and has no further questions or complaints at this time.      Bharat Sewell M.D. 5:30 PM 5/10/2023          Additional MDM:   Heart Score:    History:          Moderately suspicious.  ECG:             Nonspecific repolarisation disturbance  Age:               45-65 years  Risk factors: 1-2 risk factors  Troponin:       Less than or equal to normal limit  Final Score: 4                       Clinical Impression:   Final diagnoses:  [R07.9] Chest pain  [R07.89] Atypical chest pain (Primary)        ED Disposition Condition    Discharge Stable          ED Prescriptions       Medication Sig Dispense Start Date End Date Auth. Provider    aluminum & magnesium hydroxide-simethicone (MAALOX MAXIMUM STRENGTH) 400-400-40 mg/5 mL suspension Take 15 mLs by mouth every 6 (six) hours as needed for Indigestion. 335 mL 5/10/2023 5/9/2024 Bharat Sewell MD          Follow-up Information       Follow up With Specialties Details Why Contact [facility] Emergency Dept Emergency Medicine  As needed, If symptoms worsen [address] [place] [id number] [phone]             Bharat LAKE  MD Donato  05/10/23 1732       Bharat Sewell MD  05/10/23 1736

## 2023-05-10 NOTE — ED NOTES
"Pt identifiers checked and accurate with Radha Francis     Pt presents to ED with complaint of chest pain x 3 days with epigastric pain described as "burning". Pt denies all other complaints at this time.     "

## 2023-05-10 NOTE — ED NOTES
ED tech at the bedside for EKG   Quality 431: Preventive Care And Screening: Unhealthy Alcohol Use - Screening: Patient not identified as an unhealthy alcohol user when screened for unhealthy alcohol use using a systematic screening method Detail Level: Detailed Quality 226: Preventive Care And Screening: Tobacco Use: Screening And Cessation Intervention: Patient screened for tobacco use and is an ex/non-smoker Quality 130: Documentation Of Current Medications In The Medical Record: Current Medications Documented

## 2023-05-14 ENCOUNTER — HOSPITAL ENCOUNTER (OUTPATIENT)
Facility: HOSPITAL | Age: 48
Discharge: HOME | End: 2023-05-15
Attending: EMERGENCY MEDICINE | Admitting: INTERNAL MEDICINE
Payer: COMMERCIAL

## 2023-05-14 DIAGNOSIS — E66.01 MORBID OBESITY: ICD-10-CM

## 2023-05-14 DIAGNOSIS — E66.01 SEVERE OBESITY (BMI >= 40): Chronic | ICD-10-CM

## 2023-05-14 DIAGNOSIS — I48.91 NEW ONSET ATRIAL FIBRILLATION: Primary | ICD-10-CM

## 2023-05-14 DIAGNOSIS — R07.9 CHEST PAIN: ICD-10-CM

## 2023-05-14 DIAGNOSIS — I48.91 ATRIAL FIBRILLATION: ICD-10-CM

## 2023-05-14 DIAGNOSIS — R07.9 CHEST PAIN, UNSPECIFIED TYPE: ICD-10-CM

## 2023-05-14 PROBLEM — I10 ESSENTIAL HYPERTENSION: Chronic | Status: ACTIVE | Noted: 2023-05-14

## 2023-05-14 LAB
ALBUMIN SERPL BCP-MCNC: 3.8 G/DL (ref 3.5–5.2)
ALP SERPL-CCNC: 127 U/L (ref 55–135)
ALT SERPL W/O P-5'-P-CCNC: 13 U/L (ref 10–44)
ANION GAP SERPL CALC-SCNC: 12 MMOL/L (ref 8–16)
ANISOCYTOSIS BLD QL SMEAR: SLIGHT
AST SERPL-CCNC: 13 U/L (ref 10–40)
BASOPHILS # BLD AUTO: 0.04 K/UL (ref 0–0.2)
BASOPHILS NFR BLD: 0.6 % (ref 0–1.9)
BILIRUB SERPL-MCNC: 0.3 MG/DL (ref 0.1–1)
BNP SERPL-MCNC: <10 PG/ML (ref 0–99)
BUN SERPL-MCNC: 10 MG/DL (ref 6–20)
CALCIUM SERPL-MCNC: 9.1 MG/DL (ref 8.7–10.5)
CHLORIDE SERPL-SCNC: 106 MMOL/L (ref 95–110)
CO2 SERPL-SCNC: 24 MMOL/L (ref 23–29)
CREAT SERPL-MCNC: 0.9 MG/DL (ref 0.5–1.4)
DIFFERENTIAL METHOD: ABNORMAL
EOSINOPHIL # BLD AUTO: 0.1 K/UL (ref 0–0.5)
EOSINOPHIL NFR BLD: 2 % (ref 0–8)
ERYTHROCYTE [DISTWIDTH] IN BLOOD BY AUTOMATED COUNT: 21 % (ref 11.5–14.5)
EST. GFR  (NO RACE VARIABLE): >60 ML/MIN/1.73 M^2
GLUCOSE SERPL-MCNC: 103 MG/DL (ref 70–110)
HCT VFR BLD AUTO: 37.7 % (ref 37–48.5)
HGB BLD-MCNC: 11.5 G/DL (ref 12–16)
IMM GRANULOCYTES # BLD AUTO: 0.02 K/UL (ref 0–0.04)
IMM GRANULOCYTES NFR BLD AUTO: 0.3 % (ref 0–0.5)
LYMPHOCYTES # BLD AUTO: 1 K/UL (ref 1–4.8)
LYMPHOCYTES NFR BLD: 15.9 % (ref 18–48)
MAGNESIUM SERPL-MCNC: 2.3 MG/DL (ref 1.6–2.6)
MCH RBC QN AUTO: 21.3 PG (ref 27–31)
MCHC RBC AUTO-ENTMCNC: 30.5 G/DL (ref 32–36)
MCV RBC AUTO: 70 FL (ref 82–98)
MONOCYTES # BLD AUTO: 0.5 K/UL (ref 0.3–1)
MONOCYTES NFR BLD: 7.1 % (ref 4–15)
NEUTROPHILS # BLD AUTO: 4.7 K/UL (ref 1.8–7.7)
NEUTROPHILS NFR BLD: 74.1 % (ref 38–73)
NRBC BLD-RTO: 0 /100 WBC
OVALOCYTES BLD QL SMEAR: ABNORMAL
PLATELET # BLD AUTO: 359 K/UL (ref 150–450)
PMV BLD AUTO: 9.6 FL (ref 9.2–12.9)
POC CARDIAC TROPONIN I: 0 NG/ML (ref 0–0.08)
POIKILOCYTOSIS BLD QL SMEAR: SLIGHT
POLYCHROMASIA BLD QL SMEAR: ABNORMAL
POTASSIUM SERPL-SCNC: 3.9 MMOL/L (ref 3.5–5.1)
PROT SERPL-MCNC: 7.8 G/DL (ref 6–8.4)
RBC # BLD AUTO: 5.4 M/UL (ref 4–5.4)
SAMPLE: NORMAL
SODIUM SERPL-SCNC: 142 MMOL/L (ref 136–145)
TROPONIN I SERPL DL<=0.01 NG/ML-MCNC: 0.01 NG/ML (ref 0–0.03)
TROPONIN I SERPL DL<=0.01 NG/ML-MCNC: <0.006 NG/ML (ref 0–0.03)
WBC # BLD AUTO: 6.37 K/UL (ref 3.9–12.7)

## 2023-05-14 PROCEDURE — 99285 PR EMERGENCY DEPT VISIT,LEVEL V: ICD-10-PCS | Mod: ,,, | Performed by: EMERGENCY MEDICINE

## 2023-05-14 PROCEDURE — 99285 EMERGENCY DEPT VISIT HI MDM: CPT | Mod: 25

## 2023-05-14 PROCEDURE — 85025 COMPLETE CBC W/AUTO DIFF WBC: CPT | Performed by: EMERGENCY MEDICINE

## 2023-05-14 PROCEDURE — G0378 HOSPITAL OBSERVATION PER HR: HCPCS

## 2023-05-14 PROCEDURE — 99223 1ST HOSP IP/OBS HIGH 75: CPT | Mod: ,,,

## 2023-05-14 PROCEDURE — 25000003 PHARM REV CODE 250: Performed by: EMERGENCY MEDICINE

## 2023-05-14 PROCEDURE — 99285 EMERGENCY DEPT VISIT HI MDM: CPT | Mod: ,,, | Performed by: EMERGENCY MEDICINE

## 2023-05-14 PROCEDURE — 94761 N-INVAS EAR/PLS OXIMETRY MLT: CPT

## 2023-05-14 PROCEDURE — 93005 ELECTROCARDIOGRAM TRACING: CPT | Mod: 59

## 2023-05-14 PROCEDURE — 25000003 PHARM REV CODE 250

## 2023-05-14 PROCEDURE — 99204 PR OFFICE/OUTPT VISIT, NEW, LEVL IV, 45-59 MIN: ICD-10-PCS | Mod: ,,, | Performed by: INTERNAL MEDICINE

## 2023-05-14 PROCEDURE — 99204 OFFICE O/P NEW MOD 45 MIN: CPT | Mod: ,,, | Performed by: INTERNAL MEDICINE

## 2023-05-14 PROCEDURE — 93010 EKG 12-LEAD: ICD-10-PCS | Mod: ,,, | Performed by: INTERNAL MEDICINE

## 2023-05-14 PROCEDURE — 84484 ASSAY OF TROPONIN QUANT: CPT | Performed by: EMERGENCY MEDICINE

## 2023-05-14 PROCEDURE — 93010 ELECTROCARDIOGRAM REPORT: CPT | Mod: ,,, | Performed by: INTERNAL MEDICINE

## 2023-05-14 PROCEDURE — 80053 COMPREHEN METABOLIC PANEL: CPT | Performed by: EMERGENCY MEDICINE

## 2023-05-14 PROCEDURE — 99223 PR INITIAL HOSPITAL CARE,LEVL III: ICD-10-PCS | Mod: ,,,

## 2023-05-14 PROCEDURE — 83880 ASSAY OF NATRIURETIC PEPTIDE: CPT | Performed by: EMERGENCY MEDICINE

## 2023-05-14 PROCEDURE — 83735 ASSAY OF MAGNESIUM: CPT | Performed by: EMERGENCY MEDICINE

## 2023-05-14 RX ORDER — DEXTROSE 40 %
15 GEL (GRAM) ORAL
Status: DISCONTINUED | OUTPATIENT
Start: 2023-05-14 | End: 2023-05-15 | Stop reason: HOSPADM

## 2023-05-14 RX ORDER — GLUCAGON 1 MG
1 KIT INJECTION
Status: DISCONTINUED | OUTPATIENT
Start: 2023-05-14 | End: 2023-05-15 | Stop reason: HOSPADM

## 2023-05-14 RX ORDER — METOPROLOL SUCCINATE 50 MG/1
50 TABLET, EXTENDED RELEASE ORAL DAILY
Status: DISCONTINUED | OUTPATIENT
Start: 2023-05-14 | End: 2023-05-15 | Stop reason: HOSPADM

## 2023-05-14 RX ORDER — LOSARTAN POTASSIUM 25 MG/1
25 TABLET ORAL DAILY
COMMUNITY

## 2023-05-14 RX ORDER — ACETAMINOPHEN 325 MG/1
650 TABLET ORAL EVERY 4 HOURS PRN
Status: DISCONTINUED | OUTPATIENT
Start: 2023-05-14 | End: 2023-05-15 | Stop reason: HOSPADM

## 2023-05-14 RX ORDER — AMLODIPINE BESYLATE 10 MG/1
10 TABLET ORAL DAILY
Status: DISCONTINUED | OUTPATIENT
Start: 2023-05-14 | End: 2023-05-15 | Stop reason: HOSPADM

## 2023-05-14 RX ORDER — ONDANSETRON 4 MG/1
8 TABLET, ORALLY DISINTEGRATING ORAL EVERY 8 HOURS PRN
Status: DISCONTINUED | OUTPATIENT
Start: 2023-05-14 | End: 2023-05-15 | Stop reason: HOSPADM

## 2023-05-14 RX ORDER — PANTOPRAZOLE SODIUM 40 MG/1
40 TABLET, DELAYED RELEASE ORAL DAILY
Status: DISCONTINUED | OUTPATIENT
Start: 2023-05-15 | End: 2023-05-15 | Stop reason: HOSPADM

## 2023-05-14 RX ORDER — ACETAMINOPHEN 500 MG
1000 TABLET ORAL EVERY 8 HOURS PRN
Status: DISCONTINUED | OUTPATIENT
Start: 2023-05-14 | End: 2023-05-15 | Stop reason: HOSPADM

## 2023-05-14 RX ORDER — POLYETHYLENE GLYCOL 3350 17 G/17G
17 POWDER, FOR SOLUTION ORAL DAILY PRN
Status: DISCONTINUED | OUTPATIENT
Start: 2023-05-14 | End: 2023-05-15 | Stop reason: HOSPADM

## 2023-05-14 RX ORDER — METOPROLOL TARTRATE 1 MG/ML
5 INJECTION, SOLUTION INTRAVENOUS EVERY 5 MIN PRN
Status: DISCONTINUED | OUTPATIENT
Start: 2023-05-14 | End: 2023-05-15 | Stop reason: HOSPADM

## 2023-05-14 RX ORDER — DEXTROSE 40 %
30 GEL (GRAM) ORAL
Status: DISCONTINUED | OUTPATIENT
Start: 2023-05-14 | End: 2023-05-15 | Stop reason: HOSPADM

## 2023-05-14 RX ORDER — SODIUM CHLORIDE 0.9 % (FLUSH) 0.9 %
5 SYRINGE (ML) INJECTION
Status: DISCONTINUED | OUTPATIENT
Start: 2023-05-14 | End: 2023-05-15 | Stop reason: HOSPADM

## 2023-05-14 RX ORDER — NALOXONE HCL 0.4 MG/ML
0.02 VIAL (ML) INJECTION
Status: DISCONTINUED | OUTPATIENT
Start: 2023-05-14 | End: 2023-05-15 | Stop reason: HOSPADM

## 2023-05-14 RX ORDER — MAG HYDROX/ALUMINUM HYD/SIMETH 200-200-20
30 SUSPENSION, ORAL (FINAL DOSE FORM) ORAL 4 TIMES DAILY PRN
Status: DISCONTINUED | OUTPATIENT
Start: 2023-05-14 | End: 2023-05-15 | Stop reason: HOSPADM

## 2023-05-14 RX ORDER — BISACODYL 10 MG
10 SUPPOSITORY, RECTAL RECTAL DAILY PRN
Status: DISCONTINUED | OUTPATIENT
Start: 2023-05-14 | End: 2023-05-15 | Stop reason: HOSPADM

## 2023-05-14 RX ORDER — AMLODIPINE BESYLATE 5 MG/1
5 TABLET ORAL DAILY
Status: DISCONTINUED | OUTPATIENT
Start: 2023-05-14 | End: 2023-05-14

## 2023-05-14 RX ORDER — TALC
6 POWDER (GRAM) TOPICAL NIGHTLY PRN
Status: DISCONTINUED | OUTPATIENT
Start: 2023-05-14 | End: 2023-05-15 | Stop reason: HOSPADM

## 2023-05-14 RX ORDER — PROCHLORPERAZINE EDISYLATE 5 MG/ML
5 INJECTION INTRAMUSCULAR; INTRAVENOUS EVERY 6 HOURS PRN
Status: DISCONTINUED | OUTPATIENT
Start: 2023-05-14 | End: 2023-05-15 | Stop reason: HOSPADM

## 2023-05-14 RX ORDER — SIMETHICONE 80 MG
1 TABLET,CHEWABLE ORAL 4 TIMES DAILY PRN
Status: DISCONTINUED | OUTPATIENT
Start: 2023-05-14 | End: 2023-05-15 | Stop reason: HOSPADM

## 2023-05-14 RX ADMIN — METOPROLOL SUCCINATE 50 MG: 50 TABLET, EXTENDED RELEASE ORAL at 09:05

## 2023-05-14 RX ADMIN — Medication 6 MG: at 08:05

## 2023-05-14 RX ADMIN — APIXABAN 5 MG: 5 TABLET, FILM COATED ORAL at 04:05

## 2023-05-14 RX ADMIN — AMLODIPINE BESYLATE 10 MG: 10 TABLET ORAL at 09:05

## 2023-05-14 RX ADMIN — APIXABAN 5 MG: 5 TABLET, FILM COATED ORAL at 08:05

## 2023-05-14 NOTE — ED NOTES
Patient resting in stretcher and is in NAD at this time. Pt is awake alert and oriented x4, VSS. Pt denies pain at this time. Pt updated on POC. Bed low and locked, SR up x2, call bell in patient reach. Pt remains on continuous cardiac monitor, continuous pulse ox, and auto BP cuff. Pt voices no needs at this time.      Two patient identifiers have been checked and are correct.      Appearance: Pt awake, alert & oriented to person, place & time. Pt in no acute distress at present time. Pt is clean and well groomed with clothes appropriately fastened.   Skin: Skin warm, dry & intact. Color consistent with ethnicity. Mucous membranes moist. No breakdown or brusing noted.   Musculoskeletal: Patient moving all extremities well, no obvious swelling or deformities noted.   Respiratory: Respirations spontaneous, even, and non-labored. Visible chest rise noted. Airway is open and patent. No accessory muscle use noted.   Neurologic: Sensation is intact. Speech is clear and appropriate. Eyes open spontaneously, behavior appropriate to situation, follows commands, facial expression symmetrical, bilateral hand grasp equal and even, purposeful motor response noted.  Cardiac: All peripheral pulses present. No Bilateral lower extremity edema. Cap refill is <3 seconds.  Abdomen: Abdomen soft, non-tender to palpation.   : Pt reports no dysuria or hematuria.

## 2023-05-14 NOTE — ASSESSMENT & PLAN NOTE
Body mass index is 44.3 kg/m². Morbid obesity complicates all aspects of disease management from diagnostic modalities to treatment. Weight loss encouraged and health benefits explained to patient.

## 2023-05-14 NOTE — HPI
"Radha Francis is a 47 y.o. female with PMHx of HTN, who presents with chest pain x 1.5 weeks. Patient was recently seen in the [facility] ED with similar complaints and was discharged with Maalox for suspected GERD related pain. Patient describes the chest pain as left sided, non-radiating, with associated epigastric burning sensation. She also endorses occasional palpitations, but she feels it is not new, as it is something she has had "her whole life". Despite using the Maalox, patient did not find any relief. She stated the pain became worse tonight and she experienced associated dyspnea with it. Upon evaluation in the ED, patient noted to be in atrial fibrillation. She denies any history of atrial fibrillation. At time of admit, she states the chest pain has resolved. Denies abdominal pain, light headedness, headaches, cough, F/N/V/D.    ED: Vitals reviewed, hypertensive with /109, tachycardic with -120, on RA. CBC wnl. CMP wnl. BNP <10. Troponin negative x 1. EKG with atrial fibrillation with RVR, . CXR negative. Received eliquis 5 mg in the ED.   "

## 2023-05-14 NOTE — H&P
"[facility]  [facility]  History & Physical    Patient Name: Radha Francis  MRN: [id number]  Patient Class: OP- Observation  Admission Date: 5/14/2023  Attending Physician: Angelina Rose MD   Primary Care Provider: [facility]      Patient information was obtained from patient, past medical records and ER records.     Subjective:     Principal Problem:New onset atrial fibrillation    Chief Complaint:   Chief Complaint   Patient presents with    Chest Pain     Chest pain for over 1 week. States seen and treated at [place] but the meds they gave arent working        HPI: Radha Francis is a 47 y.o. female with PMHx of HTN, who presents with chest pain x 1.5 weeks. Patient was recently seen in the [facility] ED with similar complaints and was discharged with Maalox for suspected GERD related pain. Patient describes the chest pain as left sided, non-radiating, with associated epigastric burning sensation. She also endorses occasional palpitations, but she feels it is not new, as it is something she has had "her whole life". Despite using the Maalox, patient did not find any relief. She stated the pain became worse tonight and she experienced associated dyspnea with it. Upon evaluation in the ED, patient noted to be in atrial fibrillation. She denies any history of atrial fibrillation. At time of admit, she states the chest pain has resolved. Denies abdominal pain, light headedness, headaches, cough, F/N/V/D.    ED: Vitals reviewed, hypertensive with /109, tachycardic with -120, on RA. CBC wnl. CMP wnl. BNP <10. Troponin negative x 1. EKG with atrial fibrillation with RVR, . CXR negative. Received eliquis 5 mg in the ED.       Past Medical History:   Diagnosis Date    Hypertension        Past Surgical History:   Procedure Laterality Date    ESOPHAGOGASTRODUODENOSCOPY N/A 6/30/2022    Procedure: EGD (ESOPHAGOGASTRODUODENOSCOPY);  Surgeon: Sadia Ovalles, " MD;  Location: [address];  Service: Endoscopy;  Laterality: N/A;  fully vaccinated.EC    GASTRIC BYPASS      2007       Review of patient's allergies indicates:  No Known Allergies    No current facility-administered medications on file prior to encounter.     Current Outpatient Medications on File Prior to Encounter   Medication Sig    aluminum & magnesium hydroxide-simethicone (MAALOX MAXIMUM STRENGTH) 400-400-40 mg/5 mL suspension Take 15 mLs by mouth every 6 (six) hours as needed for Indigestion.    amLODIPine (NORVASC) 5 MG tablet Take 5 mg by mouth once daily.    ergocalciferol (VITAMIN D2) 50,000 unit Cap Take 1 capsule (50,000 Units total) by mouth once a week.     Family History       Problem Relation (Age of Onset)    Cancer Mother          Tobacco Use    Smoking status: Never    Smokeless tobacco: Never   Substance and Sexual Activity    Alcohol use: Yes     Comment: occasionally    Drug use: Never    Sexual activity: Not on file     Review of Systems   Constitutional:  Negative for activity change, chills and fever.   HENT:  Negative for trouble swallowing.    Eyes:  Negative for photophobia and visual disturbance.   Respiratory:  Positive for chest tightness and shortness of breath. Negative for cough.    Cardiovascular:  Positive for chest pain and palpitations. Negative for leg swelling.   Gastrointestinal:  Negative for abdominal pain, constipation, diarrhea, nausea and vomiting.   Genitourinary:  Negative for dysuria, frequency and hematuria.   Musculoskeletal:  Negative for back pain, gait problem and neck pain.   Skin:  Negative for rash and wound.   Neurological:  Negative for dizziness, syncope, speech difficulty and light-headedness.   Psychiatric/Behavioral:  Negative for agitation and confusion. The patient is not nervous/anxious.    Objective:     Vital Signs (Most Recent):  Temp: 97.8 °F (36.6 °C) (05/14/23 0209)  Pulse: 84 (05/14/23 0431)  Resp: 17 (05/14/23 0431)  BP:  (!) 149/92 (05/14/23 0431)  SpO2: 99 % (05/14/23 0431) Vital Signs (24h Range):  Temp:  [97.8 °F (36.6 °C)] 97.8 °F (36.6 °C)  Pulse:  [72-85] 84  Resp:  [16-20] 17  SpO2:  [98 %-100 %] 99 %  BP: (130-188)/() 149/92     Weight: 136.1 kg (300 lb)  Body mass index is 44.3 kg/m².     Physical Exam  Vitals and nursing note reviewed.   Constitutional:       General: She is not in acute distress.     Appearance: She is well-developed. She is obese. She is not ill-appearing.   HENT:      Head: Normocephalic and atraumatic.      Mouth/Throat:      Pharynx: No oropharyngeal exudate.   Eyes:      Conjunctiva/sclera: Conjunctivae normal.      Pupils: Pupils are equal, round, and reactive to light.   Cardiovascular:      Rate and Rhythm: Tachycardia present. Rhythm irregular.      Heart sounds: Normal heart sounds.      Comments: Atrial fibrillation with RVR,   Pulmonary:      Effort: Pulmonary effort is normal. No respiratory distress.      Breath sounds: Normal breath sounds. No wheezing.      Comments: On RA  Abdominal:      General: Bowel sounds are normal. There is no distension.      Palpations: Abdomen is soft.      Tenderness: There is no abdominal tenderness.   Musculoskeletal:         General: No tenderness. Normal range of motion.      Cervical back: Normal range of motion and neck supple.   Lymphadenopathy:      Cervical: No cervical adenopathy.   Skin:     General: Skin is warm and dry.      Capillary Refill: Capillary refill takes less than 2 seconds.      Findings: No rash.   Neurological:      Mental Status: She is alert and oriented to person, place, and time.      Cranial Nerves: No cranial nerve deficit.      Sensory: No sensory deficit.      Coordination: Coordination normal.   Psychiatric:         Behavior: Behavior normal.         Thought Content: Thought content normal.         Judgment: Judgment normal.            CRANIAL NERVES     CN III, IV, VI   Pupils are equal, round, and reactive to  light.     Significant Labs: All pertinent labs within the past 24 hours have been reviewed.  CBC:   Recent Labs   Lab 05/14/23 0309   WBC 6.37   HGB 11.5*   HCT 37.7        CMP:   Recent Labs   Lab 05/14/23 0309      K 3.9      CO2 24      BUN 10   CREATININE 0.9   CALCIUM 9.1   PROT 7.8   ALBUMIN 3.8   BILITOT 0.3   ALKPHOS 127   AST 13   ALT 13   ANIONGAP 12     Cardiac Markers:   Recent Labs   Lab 05/14/23 0309   BNP <10     Magnesium:   Recent Labs   Lab 05/14/23 0309   MG 2.3     Troponin:   Recent Labs   Lab 05/14/23 0309   TROPONINI 0.006       Significant Imaging: I have reviewed all pertinent imaging results/findings within the past 24 hours.  X-Ray Chest AP Portable  Narrative: EXAMINATION:  XR CHEST AP PORTABLE    CLINICAL HISTORY:  chest pain;    TECHNIQUE:  Single frontal view of the chest was performed.    COMPARISON:  Chest radiograph May 6, 2022    FINDINGS:  Single portable chest view is submitted, there is a general pattern of accentuated attenuation consistent with soft tissue attenuation associated with body habitus.  There is mild elevation of the right hemidiaphragm.  When accounting for position and technique and depth of inspiration, the appearance of the cardiomediastinal silhouette is stable.    There is no evidence for confluent infiltrate or consolidation, significant pleural effusion or pneumothorax.  The osseous structures demonstrate chronic change with prominent chronic appearing change at the shoulder joints bilaterally.  Impression: There is no radiographic evidence for acute intrathoracic process.    Electronically signed by: Reuben Tay  Date:    05/14/2023  Time:    04:17      Assessment/Plan:     * New onset atrial fibrillation  Chest pain  47 y.o. female presenting with chest pain x 1.5 weeks. Described as intermittent, left sided palpitations, associated with epigastric burning. Patient with Paroxysmal (<7 days) atrial fibrillation which is  uncontrolled currently with Beta Blocker. Patient is currently in atrial fibrillation.[id number] Score: 1. Anticoagulation indicated. Anticoagulation done with Eliquis.    - EKG with atrial fibrillation with RVR,   - BNP <10, troponin 0.006  - Patient with  atypical chest pain, endorses burning in her epigastrium with a tightness in her chest   - Possibly related to her AFib versus indigestion   - No improvement in chest pain after taking Maalox x few days  - Start Toprol-XL 50 mg daily  - Start Eliquis 5 mg BID  - Monitor telemetry  - Cardiology consulted, appreciate recommendations   - Possible DCCV  - PRN IV Metoprolol 5 mg, q5 minutes x 3 doses for HR >110  - Consider outpatient cardiology referral    Severe obesity (BMI >= 40)  Body mass index is 44.3 kg/m². Morbid obesity complicates all aspects of disease management from diagnostic modalities to treatment. Weight loss encouraged and health benefits explained to patient.    Essential hypertension  - Hypertensive in ED, uncontrolled  - Increased home amlodipine from 5 to 10 mg daily  - Started Toprol-XL 50 mg daily  - Monitor BP with q4h vitals      VTE Risk Mitigation (From admission, onward)         Ordered     apixaban tablet 5 mg  2 times daily         05/14/23 0356                 On 05/14/2023, patient should be placed in hospital observation services under my care in collaboration with Dr. Deya Boston.      Tiana Kincaid PA-C  Department of Hospital Medicine  [facility] - Emergency Dept

## 2023-05-14 NOTE — LETTER
May 15, 2023                 [facility]  [address]  [place]  [address]  Phone: [phone]  Fax: [phone] May 15, 2023     Patient: Radha Francis   YOB: 1975       To Whom It May Concern:    Radha Francis was admitted to the hospital on evening of 5/13/23 and discharged on evening of 5/15/23. Please excuse her absence from work 5/14/23-5/19/23. She may return to work on 5/20/23. I recommend light duties for one week.       Sincerely,    Lin Crane MD  Department of Hospital Medicine

## 2023-05-14 NOTE — ED TRIAGE NOTES
Radha Francis, a 47 y.o. female presents to the ED w/ complaint of left-sided CP x 1.5 weeks. Pt reports worsening when lying flat and at night. Reports going to ED on 10th with same complaint, dx with indigestion. Rx otc reflux medication which is not helping. C/o palpitations. Denies SOB     Triage note:  Chief Complaint   Patient presents with    Chest Pain     Chest pain for over 1 week. States seen and treated at [place] but the meds they gave arent working     Review of patient's allergies indicates:  No Known Allergies  Past Medical History:   Diagnosis Date    Hypertension

## 2023-05-14 NOTE — ASSESSMENT & PLAN NOTE
Chest pain  47 y.o. female presenting with chest pain x 1.5 weeks. Described as intermittent, left sided palpitations, associated with epigastric burning. Patient with Paroxysmal (<7 days) atrial fibrillation which is uncontrolled currently with Beta Blocker. Patient is currently in atrial fibrillation.[id number] Score: 1. Anticoagulation indicated. Anticoagulation done with Eliquis.    - EKG with atrial fibrillation with RVR,   - BNP <10, troponin 0.006  - Patient with  atypical chest pain, endorses burning in her epigastrium with a tightness in her chest   - Possibly related to her AFib versus indigestion   - No improvement in chest pain after taking Maalox x few days  - Start Toprol-XL 50 mg daily  - Start Eliquis 5 mg BID  - Monitor telemetry  - Cardiology consulted, appreciate recommendations   - Possible DCCV  - PRN IV Metoprolol 5 mg, q5 minutes x 3 doses for HR >110  - Consider outpatient cardiology referral

## 2023-05-14 NOTE — SUBJECTIVE & OBJECTIVE
Past Medical History:   Diagnosis Date    Hypertension        Past Surgical History:   Procedure Laterality Date    ESOPHAGOGASTRODUODENOSCOPY N/A 6/30/2022    Procedure: EGD (ESOPHAGOGASTRODUODENOSCOPY);  Surgeon: Sadia Ovalles MD;  Location: [address];  Service: Endoscopy;  Laterality: N/A;  fully vaccinated.EC    GASTRIC BYPASS      2007       Review of patient's allergies indicates:  No Known Allergies    No current facility-administered medications on file prior to encounter.     Current Outpatient Medications on File Prior to Encounter   Medication Sig    aluminum & magnesium hydroxide-simethicone (MAALOX MAXIMUM STRENGTH) 400-400-40 mg/5 mL suspension Take 15 mLs by mouth every 6 (six) hours as needed for Indigestion.    amLODIPine (NORVASC) 5 MG tablet Take 5 mg by mouth once daily.    ergocalciferol (VITAMIN D2) 50,000 unit Cap Take 1 capsule (50,000 Units total) by mouth once a week.     Family History       Problem Relation (Age of Onset)    Cancer Mother          Tobacco Use    Smoking status: Never    Smokeless tobacco: Never   Substance and Sexual Activity    Alcohol use: Yes     Comment: occasionally    Drug use: Never    Sexual activity: Not on file     Review of Systems   Constitutional:  Negative for activity change, chills and fever.   HENT:  Negative for trouble swallowing.    Eyes:  Negative for photophobia and visual disturbance.   Respiratory:  Positive for chest tightness and shortness of breath. Negative for cough.    Cardiovascular:  Positive for chest pain and palpitations. Negative for leg swelling.   Gastrointestinal:  Negative for abdominal pain, constipation, diarrhea, nausea and vomiting.   Genitourinary:  Negative for dysuria, frequency and hematuria.   Musculoskeletal:  Negative for back pain, gait problem and neck pain.   Skin:  Negative for rash and wound.   Neurological:  Negative for dizziness, syncope, speech difficulty and light-headedness.    Psychiatric/Behavioral:  Negative for agitation and confusion. The patient is not nervous/anxious.    Objective:     Vital Signs (Most Recent):  Temp: 97.8 °F (36.6 °C) (05/14/23 0209)  Pulse: 84 (05/14/23 0431)  Resp: 17 (05/14/23 0431)  BP: (!) 149/92 (05/14/23 0431)  SpO2: 99 % (05/14/23 0431) Vital Signs (24h Range):  Temp:  [97.8 °F (36.6 °C)] 97.8 °F (36.6 °C)  Pulse:  [72-85] 84  Resp:  [16-20] 17  SpO2:  [98 %-100 %] 99 %  BP: (130-188)/() 149/92     Weight: 136.1 kg (300 lb)  Body mass index is 44.3 kg/m².     Physical Exam  Vitals and nursing note reviewed.   Constitutional:       General: She is not in acute distress.     Appearance: She is well-developed. She is obese. She is not ill-appearing.   HENT:      Head: Normocephalic and atraumatic.      Mouth/Throat:      Pharynx: No oropharyngeal exudate.   Eyes:      Conjunctiva/sclera: Conjunctivae normal.      Pupils: Pupils are equal, round, and reactive to light.   Cardiovascular:      Rate and Rhythm: Tachycardia present. Rhythm irregular.      Heart sounds: Normal heart sounds.      Comments: Atrial fibrillation with RVR,   Pulmonary:      Effort: Pulmonary effort is normal. No respiratory distress.      Breath sounds: Normal breath sounds. No wheezing.      Comments: On RA  Abdominal:      General: Bowel sounds are normal. There is no distension.      Palpations: Abdomen is soft.      Tenderness: There is no abdominal tenderness.   Musculoskeletal:         General: No tenderness. Normal range of motion.      Cervical back: Normal range of motion and neck supple.   Lymphadenopathy:      Cervical: No cervical adenopathy.   Skin:     General: Skin is warm and dry.      Capillary Refill: Capillary refill takes less than 2 seconds.      Findings: No rash.   Neurological:      Mental Status: She is alert and oriented to person, place, and time.      Cranial Nerves: No cranial nerve deficit.      Sensory: No sensory deficit.      Coordination:  Coordination normal.   Psychiatric:         Behavior: Behavior normal.         Thought Content: Thought content normal.         Judgment: Judgment normal.            CRANIAL NERVES     CN III, IV, VI   Pupils are equal, round, and reactive to light.     Significant Labs: All pertinent labs within the past 24 hours have been reviewed.  CBC:   Recent Labs   Lab 05/14/23 0309   WBC 6.37   HGB 11.5*   HCT 37.7        CMP:   Recent Labs   Lab 05/14/23 0309      K 3.9      CO2 24      BUN 10   CREATININE 0.9   CALCIUM 9.1   PROT 7.8   ALBUMIN 3.8   BILITOT 0.3   ALKPHOS 127   AST 13   ALT 13   ANIONGAP 12     Cardiac Markers:   Recent Labs   Lab 05/14/23 0309   BNP <10     Magnesium:   Recent Labs   Lab 05/14/23 0309   MG 2.3     Troponin:   Recent Labs   Lab 05/14/23 0309   TROPONINI 0.006       Significant Imaging: I have reviewed all pertinent imaging results/findings within the past 24 hours.  X-Ray Chest AP Portable  Narrative: EXAMINATION:  XR CHEST AP PORTABLE    CLINICAL HISTORY:  chest pain;    TECHNIQUE:  Single frontal view of the chest was performed.    COMPARISON:  Chest radiograph May 6, 2022    FINDINGS:  Single portable chest view is submitted, there is a general pattern of accentuated attenuation consistent with soft tissue attenuation associated with body habitus.  There is mild elevation of the right hemidiaphragm.  When accounting for position and technique and depth of inspiration, the appearance of the cardiomediastinal silhouette is stable.    There is no evidence for confluent infiltrate or consolidation, significant pleural effusion or pneumothorax.  The osseous structures demonstrate chronic change with prominent chronic appearing change at the shoulder joints bilaterally.  Impression: There is no radiographic evidence for acute intrathoracic process.    Electronically signed by: Reuben Tay  Date:    05/14/2023  Time:    04:17

## 2023-05-14 NOTE — ASSESSMENT & PLAN NOTE
- Hypertensive in ED, uncontrolled  - Increased home amlodipine from 5 to 10 mg daily  - Started Toprol-XL 50 mg daily  - Monitor BP with q4h vitals

## 2023-05-14 NOTE — ED PROVIDER NOTES
History:  Radha Francis is a 47 y.o. female who presents to the ED with Chest Pain (Chest pain for over 1 week. States seen and treated at [facility] but the meds they gave arent working)    Described as 47-year-old female with a history of hypertension, history of gastric bypass surgery in 2007 presenting to the emergency department with chest pain.  She was seen for the same on 05/10/2023 and had 2 trips in the emergency department on the [place] and was discharged home for outpatient follow up.  She reports her pain has persisted over the past 1-2 weeks and became more severe tonight.  It was a tight type pain in her left chest, nonradiating, with associated epigastric burning-type pain.  She does endorse palpitations, but reports she was felt them intermittently throughout her whole life.  No shortness of breath, fevers, chills, cough, congestion.    Review of Systems: All systems reviewed and are negative except as per history of present illness.    Medications:   Previous Medications    ALUMINUM & MAGNESIUM HYDROXIDE-SIMETHICONE (MAALOX MAXIMUM STRENGTH) 400-400-40 MG/5 ML SUSPENSION    Take 15 mLs by mouth every 6 (six) hours as needed for Indigestion.    AMLODIPINE (NORVASC) 5 MG TABLET    Take 5 mg by mouth once daily.    ERGOCALCIFEROL (VITAMIN D2) 50,000 UNIT CAP    Take 1 capsule (50,000 Units total) by mouth once a week.       PMH:   Past Medical History:   Diagnosis Date    Hypertension      PSH:   Past Surgical History:   Procedure Laterality Date    ESOPHAGOGASTRODUODENOSCOPY N/A 6/30/2022    Procedure: EGD (ESOPHAGOGASTRODUODENOSCOPY);  Surgeon: Sadia Ovalles MD;  Location: [address];  Service: Endoscopy;  Laterality: N/A;  fully vaccinated.EC    GASTRIC BYPASS      2007     Allergies: She has No Known Allergies.  Social History: Marital Status: . She  reports that she has never smoked. She has never used smokeless tobacco.. She  reports current alcohol use..       Exam:  VITAL  "SIGNS:   Vitals:    05/14/23 0209 05/14/23 0300 05/14/23 0330   BP: (!) 188/109 (!) 148/91 (!) 134/101   Pulse: 83 82 72   Resp: 18 20 18   Temp: 97.8 °F (36.6 °C)     SpO2: 100% 98% 100%   Weight: 136.1 kg (300 lb)     Height: 5' 9" (1.753 m)       Const: Awake and alert, NAD   Head: Atraumatic  Eyes: Normal Conjunctiva  ENT: Normal External Ears, Nose and Mouth.  Neck: Full range of motion. No meningismus.  Resp: Normal respiratory effort, No distress, clear to auscultation bilaterally  Cardio: Equal and intact distal pulses, irregular rhythm, tachycardic  Abd: Soft, non tender, non distended.   Skin: No petechiae or rashes  Ext: No cyanosis, or edema  Neur: Awake and alert, strength and sensation intact, cranial nerves intact  Psych: Normal Mood and Affect    Data:  Results for orders placed or performed during the hospital encounter of 05/14/23   CBC auto differential   Result Value Ref Range    WBC 6.37 3.90 - 12.70 K/uL    RBC 5.40 4.00 - 5.40 M/uL    Hemoglobin 11.5 (L) 12.0 - 16.0 g/dL    Hematocrit 37.7 37.0 - 48.5 %    MCV 70 (L) 82 - 98 fL    MCH 21.3 (L) 27.0 - 31.0 pg    MCHC 30.5 (L) 32.0 - 36.0 g/dL    RDW 21.0 (H) 11.5 - 14.5 %    Platelets 359 150 - 450 K/uL    MPV 9.6 9.2 - 12.9 fL    Immature Granulocytes 0.3 0.0 - 0.5 %    Gran # (ANC) 4.7 1.8 - 7.7 K/uL    Immature Grans (Abs) 0.02 0.00 - 0.04 K/uL    Lymph # 1.0 1.0 - 4.8 K/uL    Mono # 0.5 0.3 - 1.0 K/uL    Eos # 0.1 0.0 - 0.5 K/uL    Baso # 0.04 0.00 - 0.20 K/uL    nRBC 0 0 /100 WBC    Gran % 74.1 (H) 38.0 - 73.0 %    Lymph % 15.9 (L) 18.0 - 48.0 %    Mono % 7.1 4.0 - 15.0 %    Eosinophil % 2.0 0.0 - 8.0 %    Basophil % 0.6 0.0 - 1.9 %    Aniso Slight     Poik Slight     Poly Occasional     Ovalocytes Occasional     Differential Method Automated    Comprehensive metabolic panel   Result Value Ref Range    Sodium 142 136 - 145 mmol/L    Potassium 3.9 3.5 - 5.1 mmol/L    Chloride 106 95 - 110 mmol/L    CO2 24 23 - 29 mmol/L    Glucose 103 70 - " 110 mg/dL    BUN 10 6 - 20 mg/dL    Creatinine 0.9 0.5 - 1.4 mg/dL    Calcium 9.1 8.7 - 10.5 mg/dL    Total Protein 7.8 6.0 - 8.4 g/dL    Albumin 3.8 3.5 - 5.2 g/dL    Total Bilirubin 0.3 0.1 - 1.0 mg/dL    Alkaline Phosphatase 127 55 - 135 U/L    AST 13 10 - 40 U/L    ALT 13 10 - 44 U/L    Anion Gap 12 8 - 16 mmol/L    eGFR >60.0 >60 mL/min/1.73 m^2   Troponin I #1   Result Value Ref Range    Troponin I 0.006 0.000 - 0.026 ng/mL   B-Type natriuretic peptide (BNP)   Result Value Ref Range    BNP <10 0 - 99 pg/mL   Magnesium   Result Value Ref Range    Magnesium 2.3 1.6 - 2.6 mg/dL     Imaging Results              X-Ray Chest AP Portable (In process)                   12-LEAD EKG INTERPRETATION BY ME:  Rate/Rhythm:  Atrial fibrillation with RVR with rate of 112 beats per minute  QRS, ST, T-waves:  Nonspecific ST changes  Impression:  AFib RVR, nonspecific ST changes    Labs & Imaging studies were reviewed independently by me.     Medical Decision Makin-year-old female presenting to the emergency department with chest pain times 1-2 weeks, worsened tonight.  EKG and cardiopulmonary monitoring reveals atrial fibrillation, heart rate .  No prior history of atrial fibrillation.  Patient does endorse palpitations intermittently throughout her whole life.  Unclear as to whether this is contributing to her chest pain or not.  Her chest pain is atypical, more burning in her epigastrium with a tightness in her chest, though possibly related to her AFib versus indigestion.  Symptoms have not resolved following outpatient indigestion treatment.  Initial troponin negative, BNP normal, labs otherwise unremarkable.  Chest x-ray on my interpretation reveals no signs of pneumonia pneumothorax or any other acute abnormalities.  Patient was started on Eliquis for paroxysmal/new onset Afib. Doubt PE. Plan on admission for new onset AFib and ACS rule out.    Clinical Impression:  1. New onset atrial fibrillation    2.  Chest pain                   Anat Tejada MD  05/14/23 7495

## 2023-05-14 NOTE — CONSULTS
CARDIAC ELECTROPHYSIOLOGY CONSULTATION NOTE    PATIENT: Radha Francis  MRN: [id number]  : 1975  DATE OF SERVICE: 2023    REFERRING PRACTITIONER: Self, Davied  PRIMARY CARE PROVIDER: [facility]  ATTENDING PHYSICIAN: Angelina Rose MD      Reason for Consultation:       Problem List:   47 y.o.female with:    1. HTN  2. A Fib (chads vasc 2)  3. Obesity   4. Gastric Bypass surgery     HPI:     Radha Francis is a 47 y.o. female with PMHx of HTN, who presents with chest pain x 1.5 weeks. Patient was recently seen in the [facility] ED with similar complaints and was discharged with Maalox for suspected GERD related pain. EKG at the time NSR. Was scheduled for stress test. The patient came again to the ED complaining of L sided non radiated  chest pain associated epigastric burning sensation associated with occasional palpitations. The patient states the pain became worse last night deciding to come to the ED. Denies abdominal pain, light headedness, headaches, cough, F/N/V/D.     ED hypertensive with /109, tachycardic with -120, on RA. BNP <10. Troponin negative x 2. EKG with atrial fibrillation with RVR, 112. CXR unremarkable.    Currenly on Toprolol XL 50 mg daily, eliquis and amlodipine 5 mg daily. Received eliquis 5 mg in the ED.     Active Problems:     Patient Active Problem List   Diagnosis    New onset atrial fibrillation    Essential hypertension    Severe obesity (BMI >= 40)    Chest pain     Past Medical History:     Past Medical History:   Diagnosis Date    Hypertension      Past Surgical History:     Past Surgical History:   Procedure Laterality Date    ESOPHAGOGASTRODUODENOSCOPY N/A 2022    Procedure: EGD (ESOPHAGOGASTRODUODENOSCOPY);  Surgeon: Sadia Ovalles MD;  Location: [address]);  Service: Endoscopy;  Laterality: N/A;  fully vaccinated.EC    GASTRIC BYPASS           Social History/Family History:     Social History  "    Socioeconomic History    Marital status:    Tobacco Use    Smoking status: Never    Smokeless tobacco: Never   Substance and Sexual Activity    Alcohol use: Yes     Comment: occasionally    Drug use: Never     Family History   Problem Relation Age of Onset    Cancer Mother      Medications:   (Not in a hospital admission)    Allergies:   Review of patient's allergies indicates:  No Known Allergies    Review of Systems:   Review of Systems   All other systems reviewed and are negative.     Physical Exam:     VITALS: BP (!) 140/93   Pulse 80   Temp 97.7 °F (36.5 °C)   Resp 14   Ht 5' 9" (1.753 m)   Wt 136.1 kg (300 lb)   LMP 05/01/2023 (Approximate)   SpO2 100%   Breastfeeding No   BMI 44.30 kg/m²        Eyes: Sclerae anicteric. Ears/nose/throat: Mucous membranes moist. Neck: No thyromegaly, lymphadenopathy, or jugular venous distention. Respiratory: Lungs clear to auscultation bilaterally. Cardiovascular: Heart was regular with normal first and second heart sounds. No S3 or S4. GI: Soft, nontender, nondistended, with normal bowel sounds. Musculoskeletal: extremities without cyanosis, clubbing or pitting edema. Neurologic: Patient is alert and oriented x3 and there is no focal strength deficit. Skin: no rashes, cuts, sores. Psychiatric: normal affect. Hematologic: no abnormal bruising or bleeding.    Laboratory:     BUN/Cr/glu/ALT/AST/amyl/lip:  10/0.9/--/13/13/--/-- (05/14 0309)  WBC/Hgb/Hct/Plts:  6.37/11.5/37.7/359 (05/14 0309)     Diagnostic Studies:     5/14/23 EKG:        Plan:     A fib   Paroxysmal/Persistent/Permanent/Chronic Atria Fibrillation exacerbated by...  AF History:  Onset: unknown  Symptoms: yes  PVI/cryo: no  DCCV: no  Meds; metoprolol 50 mg xl daily  EF: unknown  MARCIA: denied     Recommendations  Maintain K > 4, Mag > 2 and Ca/iCal WNL to decrease arrhythmogenic potential  TTE to to look for any evidence of valvular/structural disease and EF and decide rate controlling " agent  Anticoagulation with apixaban 5 mg BID for  for cardioembolic protection    --> [id number] Score is 2 points, which carry risk 2.2% of stroke per year    --> HASBLED score of 0 with a 1.13% risk of bleeding  Maintain on telemetry and daily morning EKGs for the next few days    --> Please perform EKG if the patient converts spontaneously   Keep NPO after MN and continue anticoagulation in preparation for YANIV-DCCV in the AM   Cardiac PET Stress test inpatient   Sleep clinic on DC  TSH   BING on discharge       Ethan Jimenez MD  [facility]  PGY 4 Cardiology Fellow

## 2023-05-14 NOTE — UM SECONDARY REVIEW
Physician Advisor External    Level of Care Issue    Approved Observation    Recommendation Summary  ? 05/14/2023 : Observation  Recommendation for 05/14/2023  Supporting Clinical Factors:  ? Hemodynamically stable  ? Acute arrhythmia or conduction abnormality with:  ? Continuous cardiac monitoring  Rationale:  The concerns of the physician are for new onset atrial fibrillation, chest pain, severe obesity.  Observation services are appropriate for this patient presenting with intermittent, left sided  palpitations, associated with epigastric burning found to have atrial fibrillation with RVR with   in the setting of hemodynamic and respiratory stability, negative troponin (x1), BNP  of less than 10 and requiring further evaluation placing the patient at low risk for cardiac  complications.

## 2023-05-14 NOTE — PLAN OF CARE
[facility] - Cardiology Stepdown  Discharge Assessment    Primary Care Provider: [facility]     Discharge Assessment (most recent)       BRIEF DISCHARGE ASSESSMENT - 05/14/23 1814          Discharge Planning    Assessment Type Discharge Planning Brief Assessment     Resource/Environmental Concerns none     Support Systems Children;Family members     Equipment Currently Used at Home none     Current Living Arrangements home     Patient/Family Anticipates Transition to home     Patient/Family Anticipated Services at Transition none     DME Needed Upon Discharge  other (see comments)   cardiac monitor    Discharge Plan A Home     Discharge Plan B Home with family        Physical Activity    On average, how many days per week do you engage in moderate to strenuous exercise (like a brisk walk)? 0 days     On average, how many minutes do you engage in exercise at this level? 0 min        Financial Resource Strain    How hard is it for you to pay for the very basics like food, housing, medical care, and heating? Not very hard        Housing Stability    In the last 12 months, was there a time when you were not able to pay the mortgage or rent on time? No     In the last 12 months, was there a time when you did not have a steady place to sleep or slept in a shelter (including now)? No        Transportation Needs    In the past 12 months, has lack of transportation kept you from medical appointments or from getting medications? No     In the past 12 months, has lack of transportation kept you from meetings, work, or from getting things needed for daily living? No        Food Insecurity    Within the past 12 months, you worried that your food would run out before you got the money to buy more. Never true     Within the past 12 months, the food you bought just didn't last and you didn't have money to get more. Never true        Stress    Do you feel stress - tense, restless, nervous, or anxious, or  unable to sleep at night because your mind is troubled all the time - these days? Not at all        Social Connections    In a typical week, how many times do you talk on the phone with family, friends, or neighbors? Twice a week     How often do you get together with friends or relatives? Twice a week     How often do you attend Sabianist or Mandaen services? 1 to 4 times per year     Do you belong to any clubs or organizations such as Sabianist groups, unions, fraternal or athletic groups, or school groups? No     How often do you attend meetings of the clubs or organizations you belong to? Never        Alcohol Use    Q1: How often do you have a drink containing alcohol? Patient refused     Q2: How many drinks containing alcohol do you have on a typical day when you are drinking? Patient refused     Q3: How often do you have six or more drinks on one occasion? Patient refused

## 2023-05-14 NOTE — PHARMACY MED REC
"Admission Medication History     The home medication history was taken by Shanel Us.    You may go to "Admission" then "Reconcile Home Medications" tabs to review and/or act upon these items.     The home medication list has been updated by the Pharmacy department.   Please read ALL comments highlighted in yellow.   Please address this information as you see fit.    Feel free to contact us if you have any questions or require assistance.      The medications listed below were removed from the home medication list. Please reorder if appropriate:  Patient reports no longer taking the following medication(s):  ALUMINUM & MAGNESIUM HYDROXIDE-SIMETHICONE 400-400-40 MG/5 ML  ERGOCALCIFEROL D2 [id number] UNIT CAPSULE      Medications listed below were obtained from: Patient/family  PTA Medications   Medication Sig    amLODIPine (NORVASC) 10 MG tablet   Take 10 mg by mouth once daily.    losartan (COZAAR) 25 MG tablet   Take 25 mg by mouth once daily.       Potential issues to be addressed PRIOR TO DISCHARGE  Please discuss with the patient barriers to adherence with medication treatment plans  Patient requires education regarding drug therapies     Shanel Us  EXT [id number]                  .        "

## 2023-05-14 NOTE — TREATMENT PLAN
Patient without new complaints    Cardiology consulted. She is NPO tonight for YANIV-DCCV in AM. Cardiac PET stress ordered, cardiac event monitored at discharged ordered, outpatient sleep study ordered. TSH in AM.     Sounds like she has chronic JOSE. Started on protonix 40 mg daily. May need GI/PCP eval once cardiac treatment and work up copmlete.

## 2023-05-14 NOTE — ED NOTES
Pt ambulated to and from restroom w/o assist. Denies any symptoms or complaints upon ambulation. Assisted back in bed and placed back on telemetry. Denies any further complaints at this time. Care ongoing.

## 2023-05-15 ENCOUNTER — ANESTHESIA (OUTPATIENT)
Dept: MEDSURG UNIT | Facility: HOSPITAL | Age: 48
End: 2023-05-15
Payer: COMMERCIAL

## 2023-05-15 ENCOUNTER — ANESTHESIA EVENT (OUTPATIENT)
Dept: MEDSURG UNIT | Facility: HOSPITAL | Age: 48
End: 2023-05-15
Payer: COMMERCIAL

## 2023-05-15 ENCOUNTER — CLINICAL SUPPORT (OUTPATIENT)
Dept: CARDIOLOGY | Facility: HOSPITAL | Age: 48
End: 2023-05-15
Attending: INTERNAL MEDICINE
Payer: COMMERCIAL

## 2023-05-15 VITALS
BODY MASS INDEX: 43.4 KG/M2 | DIASTOLIC BLOOD PRESSURE: 90 MMHG | HEART RATE: 54 BPM | TEMPERATURE: 98 F | HEIGHT: 69 IN | SYSTOLIC BLOOD PRESSURE: 146 MMHG | RESPIRATION RATE: 18 BRPM | WEIGHT: 293 LBS | OXYGEN SATURATION: 99 %

## 2023-05-15 LAB
ANION GAP SERPL CALC-SCNC: 8 MMOL/L (ref 8–16)
AORTIC ROOT ANNULUS: 2.1 CM
ASCENDING AORTA: 3.5 CM
BASOPHILS # BLD AUTO: 0.03 K/UL (ref 0–0.2)
BASOPHILS NFR BLD: 0.6 % (ref 0–1.9)
BSA FOR ECHO PROCEDURE: 2.59 M2
BUN SERPL-MCNC: 16 MG/DL (ref 6–20)
CALCIUM SERPL-MCNC: 9.3 MG/DL (ref 8.7–10.5)
CHLORIDE SERPL-SCNC: 107 MMOL/L (ref 95–110)
CO2 SERPL-SCNC: 27 MMOL/L (ref 23–29)
CREAT SERPL-MCNC: 1.1 MG/DL (ref 0.5–1.4)
DIFFERENTIAL METHOD: ABNORMAL
EJECTION FRACTION: 55 %
EOSINOPHIL # BLD AUTO: 0.1 K/UL (ref 0–0.5)
EOSINOPHIL NFR BLD: 2 % (ref 0–8)
ERYTHROCYTE [DISTWIDTH] IN BLOOD BY AUTOMATED COUNT: 20.7 % (ref 11.5–14.5)
EST. GFR  (NO RACE VARIABLE): >60 ML/MIN/1.73 M^2
GLUCOSE SERPL-MCNC: 106 MG/DL (ref 70–110)
HCT VFR BLD AUTO: 36.1 % (ref 37–48.5)
HGB BLD-MCNC: 10.8 G/DL (ref 12–16)
IMM GRANULOCYTES # BLD AUTO: 0.01 K/UL (ref 0–0.04)
IMM GRANULOCYTES NFR BLD AUTO: 0.2 % (ref 0–0.5)
LYMPHOCYTES # BLD AUTO: 1.1 K/UL (ref 1–4.8)
LYMPHOCYTES NFR BLD: 20.9 % (ref 18–48)
MAGNESIUM SERPL-MCNC: 2.1 MG/DL (ref 1.6–2.6)
MCH RBC QN AUTO: 21.2 PG (ref 27–31)
MCHC RBC AUTO-ENTMCNC: 29.9 G/DL (ref 32–36)
MCV RBC AUTO: 71 FL (ref 82–98)
MONOCYTES # BLD AUTO: 0.4 K/UL (ref 0.3–1)
MONOCYTES NFR BLD: 7.6 % (ref 4–15)
NEUTROPHILS # BLD AUTO: 3.5 K/UL (ref 1.8–7.7)
NEUTROPHILS NFR BLD: 68.7 % (ref 38–73)
NRBC BLD-RTO: 0 /100 WBC
PHOSPHATE SERPL-MCNC: 3.8 MG/DL (ref 2.7–4.5)
PLATELET # BLD AUTO: 358 K/UL (ref 150–450)
PMV BLD AUTO: 9.8 FL (ref 9.2–12.9)
POTASSIUM SERPL-SCNC: 4.4 MMOL/L (ref 3.5–5.1)
RBC # BLD AUTO: 5.1 M/UL (ref 4–5.4)
SINUS: 4 CM
SODIUM SERPL-SCNC: 142 MMOL/L (ref 136–145)
STJ: 3.3 CM
TSH SERPL DL<=0.005 MIU/L-ACNC: 0.62 UIU/ML (ref 0.4–4)
WBC # BLD AUTO: 5.12 K/UL (ref 3.9–12.7)

## 2023-05-15 PROCEDURE — 92960 CARDIOVERSION ELECTRIC EXT: CPT | Mod: ,,, | Performed by: INTERNAL MEDICINE

## 2023-05-15 PROCEDURE — G0378 HOSPITAL OBSERVATION PER HR: HCPCS

## 2023-05-15 PROCEDURE — 36415 COLL VENOUS BLD VENIPUNCTURE: CPT

## 2023-05-15 PROCEDURE — 99233 SBSQ HOSP IP/OBS HIGH 50: CPT | Mod: ,,, | Performed by: INTERNAL MEDICINE

## 2023-05-15 PROCEDURE — 93005 ELECTROCARDIOGRAM TRACING: CPT

## 2023-05-15 PROCEDURE — 93248 CV CARDIAC MONITOR - 3-15 DAY ADULT (CUPID ONLY): ICD-10-PCS | Mod: ,,, | Performed by: INTERNAL MEDICINE

## 2023-05-15 PROCEDURE — 99233 PR SUBSEQUENT HOSPITAL CARE,LEVL III: ICD-10-PCS | Mod: ,,, | Performed by: INTERNAL MEDICINE

## 2023-05-15 PROCEDURE — D9220A PRA ANESTHESIA: Mod: CRNA,,, | Performed by: NURSE ANESTHETIST, CERTIFIED REGISTERED

## 2023-05-15 PROCEDURE — 93010 ELECTROCARDIOGRAM REPORT: CPT | Mod: ,,, | Performed by: INTERNAL MEDICINE

## 2023-05-15 PROCEDURE — 85025 COMPLETE CBC W/AUTO DIFF WBC: CPT

## 2023-05-15 PROCEDURE — 83735 ASSAY OF MAGNESIUM: CPT

## 2023-05-15 PROCEDURE — 84100 ASSAY OF PHOSPHORUS: CPT

## 2023-05-15 PROCEDURE — 25000003 PHARM REV CODE 250

## 2023-05-15 PROCEDURE — 25000003 PHARM REV CODE 250: Performed by: EMERGENCY MEDICINE

## 2023-05-15 PROCEDURE — D9220A PRA ANESTHESIA: Mod: ANES,,, | Performed by: ANESTHESIOLOGY

## 2023-05-15 PROCEDURE — 80048 BASIC METABOLIC PNL TOTAL CA: CPT

## 2023-05-15 PROCEDURE — 99239 HOSP IP/OBS DSCHRG MGMT >30: CPT | Mod: ,,, | Performed by: HOSPITALIST

## 2023-05-15 PROCEDURE — D9220A PRA ANESTHESIA: ICD-10-PCS | Mod: ANES,,, | Performed by: ANESTHESIOLOGY

## 2023-05-15 PROCEDURE — 37000008 HC ANESTHESIA 1ST 15 MINUTES: Performed by: INTERNAL MEDICINE

## 2023-05-15 PROCEDURE — 93010 EKG 12-LEAD: ICD-10-PCS | Mod: ,,, | Performed by: INTERNAL MEDICINE

## 2023-05-15 PROCEDURE — 92960 PR CARDIOVERSION, ELECTIVE;EXTERN: ICD-10-PCS | Mod: ,,, | Performed by: INTERNAL MEDICINE

## 2023-05-15 PROCEDURE — 63600175 PHARM REV CODE 636 W HCPCS: Performed by: NURSE ANESTHETIST, CERTIFIED REGISTERED

## 2023-05-15 PROCEDURE — 99239 PR HOSPITAL DISCHARGE DAY,>30 MIN: ICD-10-PCS | Mod: ,,, | Performed by: HOSPITALIST

## 2023-05-15 PROCEDURE — 92960 CARDIOVERSION ELECTRIC EXT: CPT | Performed by: INTERNAL MEDICINE

## 2023-05-15 PROCEDURE — 93248 EXT ECG>7D<15D REV&INTERPJ: CPT | Mod: ,,, | Performed by: INTERNAL MEDICINE

## 2023-05-15 PROCEDURE — 25000003 PHARM REV CODE 250: Performed by: NURSE ANESTHETIST, CERTIFIED REGISTERED

## 2023-05-15 PROCEDURE — 37000009 HC ANESTHESIA EA ADD 15 MINS: Performed by: INTERNAL MEDICINE

## 2023-05-15 PROCEDURE — D9220A PRA ANESTHESIA: ICD-10-PCS | Mod: CRNA,,, | Performed by: NURSE ANESTHETIST, CERTIFIED REGISTERED

## 2023-05-15 PROCEDURE — 25000003 PHARM REV CODE 250: Performed by: INTERNAL MEDICINE

## 2023-05-15 PROCEDURE — 84443 ASSAY THYROID STIM HORMONE: CPT | Performed by: INTERNAL MEDICINE

## 2023-05-15 RX ORDER — PROPOFOL 10 MG/ML
VIAL (ML) INTRAVENOUS CONTINUOUS PRN
Status: DISCONTINUED | OUTPATIENT
Start: 2023-05-15 | End: 2023-05-15

## 2023-05-15 RX ORDER — PROPOFOL 10 MG/ML
VIAL (ML) INTRAVENOUS
Status: DISCONTINUED | OUTPATIENT
Start: 2023-05-15 | End: 2023-05-15

## 2023-05-15 RX ORDER — METOPROLOL SUCCINATE 50 MG/1
50 TABLET, EXTENDED RELEASE ORAL DAILY
Qty: 30 TABLET | Refills: 11 | Status: SHIPPED | OUTPATIENT
Start: 2023-05-16 | End: 2024-05-19

## 2023-05-15 RX ORDER — SODIUM CHLORIDE 0.9 % (FLUSH) 0.9 %
10 SYRINGE (ML) INJECTION
Status: DISCONTINUED | OUTPATIENT
Start: 2023-05-15 | End: 2023-05-15 | Stop reason: HOSPADM

## 2023-05-15 RX ORDER — PROCHLORPERAZINE EDISYLATE 5 MG/ML
5 INJECTION INTRAMUSCULAR; INTRAVENOUS EVERY 30 MIN PRN
Status: DISCONTINUED | OUTPATIENT
Start: 2023-05-15 | End: 2023-05-15 | Stop reason: HOSPADM

## 2023-05-15 RX ORDER — LIDOCAINE HYDROCHLORIDE 20 MG/ML
INJECTION INTRAVENOUS
Status: DISCONTINUED | OUTPATIENT
Start: 2023-05-15 | End: 2023-05-15

## 2023-05-15 RX ORDER — AMLODIPINE BESYLATE 10 MG/1
10 TABLET ORAL DAILY
Qty: 30 TABLET | Refills: 11 | Status: SHIPPED | OUTPATIENT
Start: 2023-05-15 | End: 2024-06-22

## 2023-05-15 RX ORDER — HYDROMORPHONE HYDROCHLORIDE 1 MG/ML
0.2 INJECTION, SOLUTION INTRAMUSCULAR; INTRAVENOUS; SUBCUTANEOUS EVERY 5 MIN PRN
Status: DISCONTINUED | OUTPATIENT
Start: 2023-05-15 | End: 2023-05-15 | Stop reason: HOSPADM

## 2023-05-15 RX ADMIN — PROPOFOL 60 MG: 10 INJECTION, EMULSION INTRAVENOUS at 02:05

## 2023-05-15 RX ADMIN — AMLODIPINE BESYLATE 10 MG: 10 TABLET ORAL at 09:05

## 2023-05-15 RX ADMIN — METOPROLOL SUCCINATE 50 MG: 50 TABLET, EXTENDED RELEASE ORAL at 09:05

## 2023-05-15 RX ADMIN — LIDOCAINE HYDROCHLORIDE 60 MG: 20 INJECTION INTRAVENOUS at 02:05

## 2023-05-15 RX ADMIN — Medication 150 MCG/KG/MIN: at 02:05

## 2023-05-15 RX ADMIN — PROPOFOL 40 MG: 10 INJECTION, EMULSION INTRAVENOUS at 02:05

## 2023-05-15 RX ADMIN — PROPOFOL 20 MG: 10 INJECTION, EMULSION INTRAVENOUS at 02:05

## 2023-05-15 RX ADMIN — APIXABAN 5 MG: 5 TABLET, FILM COATED ORAL at 09:05

## 2023-05-15 RX ADMIN — SODIUM CHLORIDE: 0.9 INJECTION, SOLUTION INTRAVENOUS at 01:05

## 2023-05-15 RX ADMIN — PANTOPRAZOLE SODIUM 40 MG: 40 TABLET, DELAYED RELEASE ORAL at 09:05

## 2023-05-15 NOTE — HPI
Patient is a 47 year old female with history of HTN that presented for chest discomfort, found to be in Afib with RVR. CHADS2-VASC 2. YANIV/DCCV requested. No prior TTE or YANIV.    Dysphagia or odynophagia:  No  Liver Disease, esophageal disease, or known varices:  No  Upper GI Bleeding: No  Prior neck surgery or radiation:  No  History of anesthetic difficulties:  No  Family history of anesthetic difficulties:  No  Able to move neck in all directions:  Yes  Snoring:  No  Sleep Apnea:  No  Last oral intake:  12 hours ago

## 2023-05-15 NOTE — PROGRESS NOTES
CARDIAC ELECTROPHYSIOLOGY CONSULTATION NOTE    PATIENT: Radha Francis  MRN: [id number]  : 1975  DATE OF SERVICE: 5/15/2023    REFERRING PRACTITIONER: Self, Aaareferral  PRIMARY CARE PROVIDER: [facility]  ATTENDING PHYSICIAN: Lin Crane MD      Problem List:   47 y.o.female with:    1. HTN  2. A Fib (chads vasc 2)  3. Obesity   4. Gastric Bypass surgery     HPI:     Radha Francis is a 47 y.o. female with PMHx of HTN, who presents with chest pain x 1.5 weeks. Patient was recently seen in the [facility] ED with similar complaints and was discharged with Maalox for suspected GERD related pain. EKG at the time NSR. Was scheduled for stress test. The patient came again to the ED complaining of L sided non radiated  chest pain associated epigastric burning sensation associated with occasional palpitations. The patient states the pain became worse last night deciding to come to the ED. Denies abdominal pain, light headedness, headaches, cough, F/N/V/D.     ED hypertensive with /109, tachycardic with -120, on RA. BNP <10. Troponin negative x 2. EKG with atrial fibrillation with RVR, 112. CXR unremarkable.    Currenly on Toprolol XL 50 mg daily, eliquis and amlodipine 5 mg daily. Received eliquis 5 mg in the ED.     24 H events:   No overnight events.     TM: A fib 60-80s    Patient is pending YANIV DCCV today.   Went for PET stress today for ischemic evaluation but couldn't do it 2/2 claustrophobia (has presented top the ED with chest pain multiple times and (RF -obese, HTN-).     On:   Apixaban   Metoprolol XL 50 mg daily     Active Problems:     Patient Active Problem List   Diagnosis    New onset atrial fibrillation    Essential hypertension    Severe obesity (BMI >= 40)    Chest pain    Morbid obesity     Past Medical History:     Past Medical History:   Diagnosis Date    Hypertension      Past Surgical History:     Past Surgical History:   Procedure Laterality Date     ESOPHAGOGASTRODUODENOSCOPY N/A 6/30/2022    Procedure: EGD (ESOPHAGOGASTRODUODENOSCOPY);  Surgeon: Sadia Ovalles MD;  Location: [address];  Service: Endoscopy;  Laterality: N/A;  fully vaccinated.EC    GASTRIC BYPASS      2007     Social History/Family History:     Social History     Socioeconomic History    Marital status:    Tobacco Use    Smoking status: Never    Smokeless tobacco: Never   Substance and Sexual Activity    Alcohol use: Yes     Comment: occasionally    Drug use: Never     Social Determinants of Health     Financial Resource Strain: Low Risk     Difficulty of Paying Living Expenses: Not very hard   Food Insecurity: No Food Insecurity    Worried About Running Out of Food in the Last Year: Never true    Ran Out of Food in the Last Year: Never true   Transportation Needs: No Transportation Needs    Lack of Transportation (Medical): No    Lack of Transportation (Non-Medical): No   Physical Activity: Inactive    Days of Exercise per Week: 0 days    Minutes of Exercise per Session: 0 min   Stress: No Stress Concern Present    Feeling of Stress : Not at all   Social Connections: Unknown    Frequency of Communication with Friends and Family: Twice a week    Frequency of Social Gatherings with Friends and Family: Twice a week    Attends Faith Services: 1 to 4 times per year    Active Member of Clubs or Organizations: No    Attends Club or Organization Meetings: Never   Housing Stability: Unknown    Unable to Pay for Housing in the Last Year: No    Unstable Housing in the Last Year: No     Family History   Problem Relation Age of Onset    Cancer Mother      Medications:     Medications Prior to Admission   Medication Sig Dispense Refill Last Dose    amLODIPine (NORVASC) 10 MG tablet Take 5 mg by mouth once daily.       losartan (COZAAR) 25 MG tablet Take 25 mg by mouth once daily.          Allergies:   Review of patient's allergies indicates:  No Known Allergies    Review of Systems:  "  Review of Systems   All other systems reviewed and are negative.     Physical Exam:     VITALS: BP (!) 141/93   Pulse 73   Temp 97.8 °F (36.6 °C)   Resp 16   Ht 5' 9" (1.753 m)   Wt (!) 138.2 kg (304 lb 9.6 oz)   LMP 05/01/2023 (Approximate)   SpO2 98%   Breastfeeding No   BMI 44.98 kg/m²        Eyes: Sclerae anicteric. Ears/nose/throat: Mucous membranes moist. Neck: No thyromegaly, lymphadenopathy, or jugular venous distention. Respiratory: Lungs clear to auscultation bilaterally. Cardiovascular: Heart was regular with normal first and second heart sounds. No S3 or S4. GI: Soft, nontender, nondistended, with normal bowel sounds. Musculoskeletal: extremities without cyanosis, clubbing or pitting edema. Neurologic: Patient is alert and oriented x3 and there is no focal strength deficit. Skin: no rashes, cuts, sores. Psychiatric: normal affect. Hematologic: no abnormal bruising or bleeding.    Laboratory:     BUN/Cr/glu/ALT/AST/amyl/lip:  16/1.1/--/--/--/--/-- (05/15 0457)  WBC/Hgb/Hct/Plts:  5.12/10.8/36.1/358 (05/15 0457)     Diagnostic Studies:     5/14/23 EKG:        Plan:     A fib   New onset A fib  PVI/cryo: no  DCCV: no  Meds; metoprolol 50 mg xl daily  EF: unknown  MARCIA: denied     TSH 0.6    Recommendations  NPO for YANIV-DCCV today  Continue anticoagulation with apixaban 5 mg BID for  for cardioembolic protection  Maintain on telemetry and daily morning EKGs for the next few days    --> Please perform EKG if the patient converts spontaneously   Patient unable to perform PET stress. She will benefit from ischemic evaluation with  stress echo once in NSR    Sleep clinic on DC to screen for MARCIA   BING on discharge (Alex).    Maintain K > 4, Mag > 2 and Ca/iCal WNL to decrease arrhythmogenic potentia      Ethan Jimenez MD  [facility]  PGY 4 Cardiology Fellow   "

## 2023-05-15 NOTE — CONSULTS
[facility] - Cardiology Stepdown  Cardiology  Consult Note    Patient Name: Radha Francis  MRN: [id number]  Admission Date: 5/14/2023  Hospital Length of Stay: 0 days  Code Status: Full Code   Attending Provider: Lin Crane MD   Consulting Provider: Leo Ny MD  Primary Care Physician: [facility]  Principal Problem:New onset atrial fibrillation    Patient information was obtained from patient, past medical records and ER records.     Consults  Subjective:     Chief Complaint:  RENETTA evaluation     HPI:   Patient is a 47 year old female with history of HTN that presented for chest discomfort, found to be in Afib with RVR. CHADS2-VASC 2. YANIV/DCCV requested. No prior TTE or YANIV.    Dysphagia or odynophagia:  No  Liver Disease, esophageal disease, or known varices:  No  Upper GI Bleeding: No  Prior neck surgery or radiation:  No  History of anesthetic difficulties:  No  Family history of anesthetic difficulties:  No  Able to move neck in all directions:  Yes  Snoring:  No  Sleep Apnea:  No  Last oral intake:  12 hours ago        Past Medical History:   Diagnosis Date    Hypertension        Past Surgical History:   Procedure Laterality Date    ESOPHAGOGASTRODUODENOSCOPY N/A 6/30/2022    Procedure: EGD (ESOPHAGOGASTRODUODENOSCOPY);  Surgeon: Sadia Ovalles MD;  Location: [address]);  Service: Endoscopy;  Laterality: N/A;  fully vaccinated.EC    GASTRIC BYPASS      2007       Review of patient's allergies indicates:  No Known Allergies    No current facility-administered medications on file prior to encounter.     Current Outpatient Medications on File Prior to Encounter   Medication Sig    amLODIPine (NORVASC) 10 MG tablet Take 5 mg by mouth once daily.    losartan (COZAAR) 25 MG tablet Take 25 mg by mouth once daily.     Family History       Problem Relation (Age of Onset)    Cancer Mother          Tobacco Use    Smoking status: Never    Smokeless tobacco: Never   Substance  and Sexual Activity    Alcohol use: Yes     Comment: occasionally    Drug use: Never    Sexual activity: Not on file     Review of Systems   Constitutional: Negative for chills and fever.   Cardiovascular:  Negative for chest pain, orthopnea and palpitations.   Respiratory:  Negative for cough and shortness of breath.    Gastrointestinal:  Negative for abdominal pain.   Neurological:  Negative for dizziness, headaches and light-headedness.   Psychiatric/Behavioral:  Negative for altered mental status.    Objective:     Vital Signs (Most Recent):  Temp: 97.8 °F (36.6 °C) (05/15/23 0706)  Pulse: 73 (05/15/23 0706)  Resp: 16 (05/15/23 0706)  BP: (!) 141/93 (05/15/23 0706)  SpO2: 98 % (05/15/23 0706) Vital Signs (24h Range):  Temp:  [97.6 °F (36.4 °C)-98 °F (36.7 °C)] 97.8 °F (36.6 °C)  Pulse:  [] 73  Resp:  [16-19] 16  SpO2:  [94 %-99 %] 98 %  BP: (124-167)/(61-93) 141/93     Weight: (!) 138.2 kg (304 lb 9.6 oz)  Body mass index is 44.98 kg/m².    SpO2: 98 %       No intake or output data in the 24 hours ending 05/15/23 0845    Lines/Drains/Airways       Peripheral Intravenous Line  Duration                  Peripheral IV - Single Lumen 05/14/23 0310 20 G Anterior;Right Forearm 1 day                     Physical Exam  Vitals and nursing note reviewed.   Constitutional:       General: She is not in acute distress.     Appearance: Normal appearance. She is obese. She is not toxic-appearing.   HENT:      Head: Normocephalic and atraumatic.      Mouth/Throat:      Mouth: Mucous membranes are moist.   Cardiovascular:      Rate and Rhythm: Normal rate. Rhythm irregular.      Heart sounds: No murmur heard.    No friction rub. No gallop.   Pulmonary:      Effort: Pulmonary effort is normal. No respiratory distress.      Breath sounds: Normal breath sounds.   Abdominal:      Palpations: Abdomen is soft.   Musculoskeletal:      Right lower leg: No edema.      Left lower leg: No edema.   Skin:     General: Skin is warm.    Neurological:      Mental Status: She is alert and oriented to person, place, and time. Mental status is at baseline.        Significant Labs: BMP:   Recent Labs   Lab 05/14/23  0309 05/15/23  0457    106    142   K 3.9 4.4    107   CO2 24 27   BUN 10 16   CREATININE 0.9 1.1   CALCIUM 9.1 9.3   MG 2.3 2.1   , CMP   Recent Labs   Lab 05/14/23  0309 05/15/23  0457    142   K 3.9 4.4    107   CO2 24 27    106   BUN 10 16   CREATININE 0.9 1.1   CALCIUM 9.1 9.3   PROT 7.8  --    ALBUMIN 3.8  --    BILITOT 0.3  --    ALKPHOS 127  --    AST 13  --    ALT 13  --    ANIONGAP 12 8   , CBC   Recent Labs   Lab 05/14/23  0309 05/15/23  0457   WBC 6.37 5.12   HGB 11.5* 10.8*   HCT 37.7 36.1*    358   , and All pertinent lab results from the last 24 hours have been reviewed.    Significant Imaging: Echocardiogram: Transthoracic echo (TTE) complete (Cupid Only): No results found for this or any previous visit.    Assessment and Plan:     * New onset atrial fibrillation  Patient is a 47 year old female with history of HTN that presented for chest discomfort, found to be in Afib with RVR. CHADS2-VASC 2. YANIV/DCCV requested.  No prior TTE or AYNIV.    1. YANIV for evaluation of RENETTA  -No absolute contraindications of esophageal stricture, tumor, perforation, laceration,or diverticulum and/or active GI bleed  -The risks, benefits & alternatives of the procedure were explained to the patient.   -The risks of transesophageal echo include but are not limited to:  Dental trauma, esophageal trauma/perforation, bleeding, laryngospasm/brochospasm, aspiration, sore throat/hoarseness, & dislodgement of the endotracheal tube/nasogastric tube (where applicable).    -The risks of moderate sedation include hypotension, respiratory depression, arrhythmias, bronchospasm, & death.    -Informed consent was obtained. The patient is agreeable to proceed with the procedure and all questions and concerns  addressed.    Staff attestation to follow. Further recommendations per attending addendum          VTE Risk Mitigation (From admission, onward)         Ordered     apixaban tablet 5 mg  2 times daily         05/14/23 0356                Thank you for your consult. We will sign off post YANIV. Please contact us if you have any additional questions.    Leo Ny MD  Cardiology PGY5  [facility] - Cardiology Stepdown

## 2023-05-15 NOTE — ASSESSMENT & PLAN NOTE
Patient is a 47 year old female with history of HTN that presented for chest discomfort, found to be in Afib with RVR. CHADS2-VASC 2. YANIV/DCCV requested.  No prior TTE or YANIV.    1. YANIV for evaluation of RENETTA  -No absolute contraindications of esophageal stricture, tumor, perforation, laceration,or diverticulum and/or active GI bleed  -The risks, benefits & alternatives of the procedure were explained to the patient.   -The risks of transesophageal echo include but are not limited to:  Dental trauma, esophageal trauma/perforation, bleeding, laryngospasm/brochospasm, aspiration, sore throat/hoarseness, & dislodgement of the endotracheal tube/nasogastric tube (where applicable).    -The risks of moderate sedation include hypotension, respiratory depression, arrhythmias, bronchospasm, & death.    -Informed consent was obtained. The patient is agreeable to proceed with the procedure and all questions and concerns addressed.    Staff attestation to follow. Further recommendations per attending addendum

## 2023-05-15 NOTE — DISCHARGE SUMMARY
"[facility] - Cardiology  [facility] Medicine  Discharge Summary      Patient Name: Radha Francis  MRN: [id number]  ZITA: [id number]  Patient Class: OP- Observation  Admission Date: 5/14/2023  Hospital Length of Stay: 0 days  Discharge Date and Time:  05/15/2023 4:18 PM  Attending Physician: Lin Crane MD   Discharging Provider: Lin Crane MD  Primary Care Provider: [facility] Medicine Team: [facility] MED  Lin Crane MD  Primary Care Team: [facility] MED     HPI:   Radha Francis is a 47 y.o. female with PMHx of HTN, who presents with chest pain x 1.5 weeks. Patient was recently seen in the [facility] ED with similar complaints and was discharged with Maalox for suspected GERD related pain. Patient describes the chest pain as left sided, non-radiating, with associated epigastric burning sensation. She also endorses occasional palpitations, but she feels it is not new, as it is something she has had "her whole life". Despite using the Maalox, patient did not find any relief. She stated the pain became worse tonight and she experienced associated dyspnea with it. Upon evaluation in the ED, patient noted to be in atrial fibrillation. She denies any history of atrial fibrillation. At time of admit, she states the chest pain has resolved. Denies abdominal pain, light headedness, headaches, cough, F/N/V/D.    ED: Vitals reviewed, hypertensive with /109, tachycardic with -120, on RA. CBC wnl. CMP wnl. BNP <10. Troponin negative x 1. EKG with atrial fibrillation with RVR, . CXR negative. Received eliquis 5 mg in the ED.       Procedure(s) (LRB):  Cardioversion or Defibrillation (N/A)  Transesophageal echo (YANIV) intra-procedure log documentation (N/A)      Hospital Course:   Presented with AFib with RVR that converted to sinus after YANIV/DCCV. Toprol and Eliquis on discharge. Bardy patch on discharge. EP also recommended outpatient dobutamine stress echo. Will f/u with " EP.        Goals of Care Treatment Preferences:  Code Status: Full Code      Consults:   Consults (From admission, onward)        Status Ordering Provider     Inpatient consult to Electrophysiology  Once        Provider:  (Not yet assigned)    Completed ROGELIO AHUMADA          No new Assessment & Plan notes have been filed under this hospital service since the last note was generated.  Service: Hospital Medicine    Final Active Diagnoses:    Diagnosis Date Noted POA    PRINCIPAL PROBLEM:  New onset atrial fibrillation [I48.91] 05/14/2023 Yes    Essential hypertension [I10] 05/14/2023 Yes     Chronic    Severe obesity (BMI >= 40) [E66.01] 05/14/2023 Yes     Chronic    Chest pain [R07.9] 05/14/2023 Yes    Morbid obesity [E66.01] 05/14/2023 Yes      Problems Resolved During this Admission:       Discharged Condition: good    Disposition: Home or Self Care    Follow Up:   Follow-up Information     [facility]. Schedule an appointment as soon as possible for a visit.    Contact information:  [address]  [facility] [address]  [phone]             Ethan Peña MD Follow up.    Specialties: Electrophysiology, Cardiology  Contact information:  [address]  [facility] [address]  [phone]                       Patient Instructions:      Notify your health care provider if you experience any of the following:  difficulty breathing or increased cough     Notify your health care provider if you experience any of the following:  persistent dizziness, light-headedness, or visual disturbances     Notify your health care provider if you experience any of the following:   Order Comments: Bleeding or sustained elevated heart rate greater than 110     Stress Echo Which stress agent will be used? Pharmacological; Color Flow Doppler? No   Standing Status: Future Standing Exp. Date: 05/15/24     Order Specific Question Answer Comments   Which stress agent will be used? Pharmacological     Color Flow Doppler? No    Release to patient Immediate      Polysomnogram (CPAP will be added if patient meets diagnostic criteria.)   Standing Status: Future Standing Exp. Date: 05/14/24     Activity as tolerated       Significant Diagnostic Studies: Labs:   CMP   Recent Labs   Lab 05/14/23  0309 05/15/23  0457    142   K 3.9 4.4    107   CO2 24 27    106   BUN 10 16   CREATININE 0.9 1.1   CALCIUM 9.1 9.3   PROT 7.8  --    ALBUMIN 3.8  --    BILITOT 0.3  --    ALKPHOS 127  --    AST 13  --    ALT 13  --    ANIONGAP 12 8    and CBC   Recent Labs   Lab 05/14/23  0309 05/15/23  0457   WBC 6.37 5.12   HGB 11.5* 10.8*   HCT 37.7 36.1*    358       Pending Diagnostic Studies:     None         Medications:  Reconciled Home Medications:      Medication List      START taking these medications    apixaban 5 mg Tab  Commonly known as: ELIQUIS  Take 1 tablet (5 mg total) by mouth 2 (two) times daily.     metoprolol succinate 50 MG 24 hr tablet  Commonly known as: TOPROL-XL  Take 1 tablet (50 mg total) by mouth once daily.  Start taking on: May 16, 2023        CHANGE how you take these medications    amLODIPine 10 MG tablet  Commonly known as: NORVASC  Take 1 tablet (10 mg total) by mouth once daily.  What changed: how much to take        CONTINUE taking these medications    losartan 25 MG tablet  Commonly known as: COZAAR  Take 25 mg by mouth once daily.            Indwelling Lines/Drains at time of discharge:   Lines/Drains/Airways     None                 Time spent on the discharge of patient: 35 minutes         Lin Crane MD  Department of Hospital Medicine  [facility] - Cardiology

## 2023-05-15 NOTE — NURSING
On call paged ( PA, HEAVEN gonzalez) notified of pt -160's RVR, asymptomatic, Metoprolol was ordered, and will follow through with orders

## 2023-05-15 NOTE — HOSPITAL COURSE
Presented with AFib with RVR that converted to sinus after YANIV/DCCV. Toprol and Eliquis on discharge. Bardy patch on discharge. EP also recommended outpatient dobutamine stress echo. Will f/u with EP.

## 2023-05-15 NOTE — TRANSFER OF CARE
"Anesthesia Transfer of Care Note    Patient: Radha Francis    Procedure(s) Performed: Procedure(s) (LRB):  Cardioversion or Defibrillation (N/A)  Transesophageal echo (YANIV) intra-procedure log documentation (N/A)    Patient location: PACU    Anesthesia Type: general    Transport from OR: Transported from OR on 2-3 L/min O2 by NC with adequate spontaneous ventilation    Post pain: adequate analgesia    Post assessment: no apparent anesthetic complications and tolerated procedure well    Post vital signs: stable    Level of consciousness: awake and alert    Nausea/Vomiting: no nausea/vomiting    Complications: none    Transfer of care protocol was followed      Last vitals:   Visit Vitals  BP (!) 152/114   Pulse 104   Temp 36.5 °C (97.7 °F) (Oral)   Resp 19   Ht 5' 9" (1.753 m)   Wt (!) 137.9 kg (304 lb)   LMP 05/01/2023 (Approximate)   SpO2 (!) 94%   Breastfeeding No   BMI 44.89 kg/m²     "

## 2023-05-15 NOTE — ANESTHESIA PREPROCEDURE EVALUATION
05/15/2023  Radha Francis is a 47 y.o., female.  [facility]  Anesthesia Pre-Operative Evaluation       Patient Name: Radha Francis  YOB: 1975  MRN: [id number]  CSN: [id number]      Code Status: Full Code   Date of Procedure: 5/15/2023  Anesthesia: Choice Procedure: Procedure(s) (LRB):  Cardioversion or Defibrillation (N/A)  Transesophageal echo (YANIV) intra-procedure log documentation (N/A)  Pre-Operative Diagnosis: New onset atrial fibrillation [I48.91]  Proceduralist: Surgeon(s) and Role:  Panel 1:     * Ethan Peña MD - Primary  Panel 2:     * [facility] Diagnostic Provider - Primary        SUBJECTIVE:   Radha Francis is a 47 y.o. female who  has a past medical history of Hypertension. Patient is a 47 year old female with history of HTN that presented for chest discomfort, found to be in Afib with RVR. CHADS2-VASC 2. YANIV/DCCV requested. No prior TTE or YANIV.    Dysphagia or odynophagia:  No  Liver Disease, esophageal disease, or known varices:  No  Upper GI Bleeding: No  Prior neck surgery or radiation:  No  History of anesthetic difficulties:  No  Family history of anesthetic difficulties:  No  Able to move neck in all directions:  Yes  Snoring:  No  Sleep Apnea:  No  Last oral intake:  12 hours ago        she has a current medication list which includes the following long-term medication(s): amlodipine and losartan.   ALLERGIES:   Review of patient's allergies indicates:  No Known Allergies  LDA:          Lines/Drains/Airways     Peripheral Intravenous Line  Duration                Peripheral IV - Single Lumen 05/14/23 0310 20 G Anterior;Right Forearm 1 day              MEDICATIONS:     Antibiotics (From admission, onward)    None        VTE Risk Mitigation (From admission, onward)         Ordered     apixaban tablet 5 mg  2 times daily         05/14/23 9864                 History:     Active Hospital Problems    Diagnosis  POA    *New onset atrial fibrillation [I48.91]  Yes    Essential hypertension [I10]  Yes     Chronic    Severe obesity (BMI >= 40) [E66.01]  Yes     Chronic    Chest pain [R07.9]  Yes    Morbid obesity [E66.01]  Yes      Resolved Hospital Problems   No resolved problems to display.     Past Medical History:   Diagnosis Date    Hypertension      Patient Active Problem List   Diagnosis    New onset atrial fibrillation    Essential hypertension    Severe obesity (BMI >= 40)    Chest pain    Morbid obesity       Surgical History:    has a past surgical history that includes Gastric bypass and Esophagogastroduodenoscopy (N/A, 6/30/2022).   Social History:     reports that she has never smoked. She has never used smokeless tobacco. She reports current alcohol use. She reports that she does not use drugs.     OBJECTIVE:     Vital Signs (Most Recent):  Temp: 36.5 °C (97.7 °F) (05/15/23 1103)  Pulse: 104 (05/15/23 1111)  Resp: 19 (05/15/23 1103)  BP: 137/80 (05/15/23 1103)  SpO2: (!) 94 % (05/15/23 1103) Vital Signs Range (Last 24H):  Temp:  [36.5 °C (97.7 °F)-36.7 °C (98 °F)]   Pulse:  []   Resp:  [16-19]   BP: (124-141)/(61-93)   SpO2:  [94 %-99 %]        Body mass index is 44.98 kg/m².   Wt Readings from Last 4 Encounters:   05/14/23 (!) 138.2 kg (304 lb 9.6 oz)   05/10/23 136.1 kg (300 lb)   06/30/22 131.5 kg (290 lb)   06/02/22 135.5 kg (298 lb 11.6 oz)     Significant Labs:  Lab Results   Component Value Date    WBC 5.12 05/15/2023    HGB 10.8 (L) 05/15/2023    HCT 36.1 (L) 05/15/2023     05/15/2023     05/15/2023    K 4.4 05/15/2023     05/15/2023    CREATININE 1.1 05/15/2023    BUN 16 05/15/2023    CO2 27 05/15/2023     05/15/2023    CALCIUM 9.3 05/15/2023    MG 2.1 05/15/2023    PHOS 3.8 05/15/2023    ALKPHOS 127 05/14/2023    ALT 13 05/14/2023    AST 13 05/14/2023    ALBUMIN 3.8 05/14/2023    INR 1.0 11/03/2007     APTT 23.0 11/03/2007    HGBA1C 5.7 (H) 05/26/2022    TROPONINI <0.006 05/14/2023    BNP <10 05/14/2023    PREGTESTUR Negative 05/10/2023    HCGQUANT <2.0 01/16/2008     Patient's last menstrual period was 05/01/2023 (approximate).    EKG:   Results for orders placed or performed during the hospital encounter of 05/14/23   EKG 12-lead    Collection Time: 05/14/23  2:12 AM    Narrative    Test Reason : R07.9,    Vent. Rate : 112 BPM     Atrial Rate : 000 BPM     P-R Int : 000 ms          QRS Dur : 078 ms      QT Int : 340 ms       P-R-T Axes : 000 004 -21 degrees     QTc Int : 464 ms    Atrial fibrillation with rapid ventricular response  Minimal voltage criteria for LVH, may be normal variant ( R in aVL )  ST and T wave abnormality, consider inferior ischemia  Abnormal ECG  When compared with ECG of 10-MAY-2023 09:06,  Atrial fibrillation has replaced Sinus rhythm  Vent. rate has increased BY  49 BPM  ST now depressed in Lateral leads  Confirmed by Juaquin CORRALES MD ([id number]) on 5/14/2023 9:12:47 AM    Referred By: [email or web address]   SELF           Confirmed By:Juaquin CORRALES MD       ASSESSMENT/PLAN:         Pre-op Assessment    I have reviewed the Patient Summary Reports.     I have reviewed the Nursing Notes. I have reviewed the NPO Status.   I have reviewed the Medications.     Review of Systems      Physical Exam  General: Well nourished, Cooperative and Alert    Airway:  Mallampati: III / II  Mouth Opening: Normal  TM Distance: Normal  Tongue: Normal  Neck ROM: Normal ROM    Chest/Lungs:  Normal Respiratory Rate    Heart:  Rate: Normal  Rhythm: Regular Rhythm        Anesthesia Plan  Type of Anesthesia, risks & benefits discussed:    Anesthesia Type: Gen Natural Airway, MAC  Intra-op Monitoring Plan: Standard ASA Monitors  Post Op Pain Control Plan: IV/PO Opioids PRN  Induction:  IV  Informed Consent: Informed consent signed with the Patient and all parties understand the risks and agree with anesthesia plan.  All questions  answered.   ASA Score: 3  Day of Surgery Review of History & Physical: H&P Update referred to the surgeon/provider.    Ready For Surgery From Anesthesia Perspective.     .

## 2023-05-15 NOTE — NURSING TRANSFER
Nursing Transfer Note      5/15/2023     Reason patient is being transferred: post procedure    Transfer To: 345    Transfer via stretcher    Transfer with cardiac monitoring    Transported by nurse    Telemetry: Box Number      Medicines sent: none    Any special needs or follow-up needed: routine    Chart send with patient: Yes    Notified: family in room    Patient reassessed at: 9232 5/15/2023

## 2023-05-15 NOTE — SUBJECTIVE & OBJECTIVE
Past Medical History:   Diagnosis Date    Hypertension        Past Surgical History:   Procedure Laterality Date    ESOPHAGOGASTRODUODENOSCOPY N/A 6/30/2022    Procedure: EGD (ESOPHAGOGASTRODUODENOSCOPY);  Surgeon: Sadia Ovalles MD;  Location: [address];  Service: Endoscopy;  Laterality: N/A;  fully vaccinated.EC    GASTRIC BYPASS      2007       Review of patient's allergies indicates:  No Known Allergies    No current facility-administered medications on file prior to encounter.     Current Outpatient Medications on File Prior to Encounter   Medication Sig    amLODIPine (NORVASC) 10 MG tablet Take 5 mg by mouth once daily.    losartan (COZAAR) 25 MG tablet Take 25 mg by mouth once daily.     Family History       Problem Relation (Age of Onset)    Cancer Mother          Tobacco Use    Smoking status: Never    Smokeless tobacco: Never   Substance and Sexual Activity    Alcohol use: Yes     Comment: occasionally    Drug use: Never    Sexual activity: Not on file     Review of Systems   Constitutional: Negative for chills and fever.   Cardiovascular:  Negative for chest pain, orthopnea and palpitations.   Respiratory:  Negative for cough and shortness of breath.    Gastrointestinal:  Negative for abdominal pain.   Neurological:  Negative for dizziness, headaches and light-headedness.   Psychiatric/Behavioral:  Negative for altered mental status.    Objective:     Vital Signs (Most Recent):  Temp: 97.8 °F (36.6 °C) (05/15/23 0706)  Pulse: 73 (05/15/23 0706)  Resp: 16 (05/15/23 0706)  BP: (!) 141/93 (05/15/23 0706)  SpO2: 98 % (05/15/23 0706) Vital Signs (24h Range):  Temp:  [97.6 °F (36.4 °C)-98 °F (36.7 °C)] 97.8 °F (36.6 °C)  Pulse:  [] 73  Resp:  [16-19] 16  SpO2:  [94 %-99 %] 98 %  BP: (124-167)/(61-93) 141/93     Weight: (!) 138.2 kg (304 lb 9.6 oz)  Body mass index is 44.98 kg/m².    SpO2: 98 %       No intake or output data in the 24 hours ending 05/15/23 0845    Lines/Drains/Airways        Peripheral Intravenous Line  Duration                  Peripheral IV - Single Lumen 05/14/23 0310 20 G Anterior;Right Forearm 1 day                     Physical Exam  Vitals and nursing note reviewed.   Constitutional:       General: She is not in acute distress.     Appearance: Normal appearance. She is obese. She is not toxic-appearing.   HENT:      Head: Normocephalic and atraumatic.      Mouth/Throat:      Mouth: Mucous membranes are moist.   Cardiovascular:      Rate and Rhythm: Normal rate. Rhythm irregular.      Heart sounds: No murmur heard.    No friction rub. No gallop.   Pulmonary:      Effort: Pulmonary effort is normal. No respiratory distress.      Breath sounds: Normal breath sounds.   Abdominal:      Palpations: Abdomen is soft.   Musculoskeletal:      Right lower leg: No edema.      Left lower leg: No edema.   Skin:     General: Skin is warm.   Neurological:      Mental Status: She is alert and oriented to person, place, and time. Mental status is at baseline.        Significant Labs: BMP:   Recent Labs   Lab 05/14/23  0309 05/15/23  0457    106    142   K 3.9 4.4    107   CO2 24 27   BUN 10 16   CREATININE 0.9 1.1   CALCIUM 9.1 9.3   MG 2.3 2.1   , CMP   Recent Labs   Lab 05/14/23  0309 05/15/23  0457    142   K 3.9 4.4    107   CO2 24 27    106   BUN 10 16   CREATININE 0.9 1.1   CALCIUM 9.1 9.3   PROT 7.8  --    ALBUMIN 3.8  --    BILITOT 0.3  --    ALKPHOS 127  --    AST 13  --    ALT 13  --    ANIONGAP 12 8   , CBC   Recent Labs   Lab 05/14/23  0309 05/15/23  0457   WBC 6.37 5.12   HGB 11.5* 10.8*   HCT 37.7 36.1*    358   , and All pertinent lab results from the last 24 hours have been reviewed.    Significant Imaging: Echocardiogram: Transthoracic echo (TTE) complete (Cupid Only): No results found for this or any previous visit.

## 2023-05-16 ENCOUNTER — TELEPHONE (OUTPATIENT)
Dept: CARDIOLOGY | Facility: HOSPITAL | Age: 48
End: 2023-05-16

## 2023-05-16 ENCOUNTER — HOSPITAL ENCOUNTER (OUTPATIENT)
Dept: RADIOLOGY | Facility: HOSPITAL | Age: 48
Discharge: HOME | End: 2023-05-16
Attending: EMERGENCY MEDICINE
Payer: COMMERCIAL

## 2023-05-16 DIAGNOSIS — R07.89 ATYPICAL CHEST PAIN: ICD-10-CM

## 2023-05-16 DIAGNOSIS — R76.8 HEPATITIS C ANTIBODY POSITIVE IN BLOOD: Primary | ICD-10-CM

## 2023-05-16 NOTE — NURSING
Patient discharged to home. Vitals stable. No complaints of chest pain or shortness of breath. Patient in normal sinus rhythm. Patient given discharge medication and verbalized understanding of next steps and appointments. Tele removed and returned to nursing station. Peripheral iv removed. Patient aware of monitoring on chest for 14 days, and not to shower with it.

## 2023-05-16 NOTE — ANESTHESIA POSTPROCEDURE EVALUATION
Anesthesia Post Evaluation    Patient: Radha Francis    Procedure(s) Performed: Procedure(s) (LRB):  Cardioversion or Defibrillation (N/A)  Transesophageal echo (YANIV) intra-procedure log documentation (N/A)    Final Anesthesia Type: general      Patient location during evaluation: Cath Lab  Patient participation: Yes- Able to Participate  Level of consciousness: awake and alert  Post-procedure vital signs: reviewed and stable  Pain management: adequate  Airway patency: patent    PONV status at discharge: No PONV  Anesthetic complications: no      Cardiovascular status: blood pressure returned to baseline  Respiratory status: unassisted, spontaneous ventilation and room air  Hydration status: euvolemic            Vitals Value Taken Time   /90 05/15/23 1609   Temp 36.4 °C (97.5 °F) 05/15/23 1609   Pulse 54 05/15/23 1609   Resp 18 05/15/23 1609   SpO2 99 % 05/15/23 1609         Event Time   Out of Recovery 05/15/2023 15:45:00         Pain/Garth Score: Garth Score: 9 (5/15/2023  3:03 PM)

## 2023-05-16 NOTE — PLAN OF CARE
Pt discharged home with no post-acute needs. F/U appts scheduled bu [facility].    Massiel Rowe [facility]  Case Management Department  [email or web address]    [facility] - Cardiology Stepdown  Discharge Final Note    Primary Care Provider: Ana Maria Joseph [facility]    Expected Discharge Date: 5/15/2023    Final Discharge Note (most recent)       Final Note - 05/16/23 1106          Final Note    Assessment Type Final Discharge Note     Anticipated Discharge Disposition Home or Self Care     What phone number can be called within the next 1-3 days to see how you are doing after discharge? [phone]     Hospital Resources/Appts/Education Provided Provided patient/caregiver with written discharge plan information;Appointments scheduled and added to AVS;Appointments scheduled by Navigator/Coordinator        Post-Acute Status    Post-Acute Authorization Other     Other Status No Post-Acute Service Needs     Discharge Delays None known at this time                     Important Message from Medicare             Contact Info       Ana Maria Joseph [facility]   Relationship: PCP - General    [address]  [facility] [address]   Phone: [phone]       Next Steps: Schedule an appointment as soon as possible for a visit    Ethan Peña MD   Specialty: Electrophysiology, Cardiology    [address]  [facility] [address]   Phone: [phone]       Next Steps: Follow up          Future Appointments   Date Time Provider Department Center   5/17/2023  8:00 AM CV [facility] STRESS 2 [facility] [address]   8/2/2023  9:00 AM Marylin Rodriguez PA-C [facility]

## 2023-05-17 DIAGNOSIS — I48.91 NEW ONSET ATRIAL FIBRILLATION: ICD-10-CM

## 2023-05-17 DIAGNOSIS — R07.9 CHEST PAIN, UNSPECIFIED TYPE: Primary | ICD-10-CM

## 2023-05-17 DIAGNOSIS — I10 ESSENTIAL HYPERTENSION: Chronic | ICD-10-CM

## 2023-05-19 ENCOUNTER — TELEPHONE (OUTPATIENT)
Dept: ELECTROPHYSIOLOGY | Facility: CLINIC | Age: 48
End: 2023-05-19
Payer: COMMERCIAL

## 2023-05-19 ENCOUNTER — TELEPHONE (OUTPATIENT)
Dept: SURGERY | Facility: CLINIC | Age: 48
End: 2023-05-19
Payer: COMMERCIAL

## 2023-05-19 NOTE — TELEPHONE ENCOUNTER
"Pt reports she will need dental extractions of full front top teeth and that her dental office needs a clearance to hold her eliquis, informed pt that she will not be cleared to hold her eliquis for 30 days after her DCCV that was done on 5/15. Will send a clearance letter per her request that she can be cleared to hold it after 6/15/23, pt verbalized understanding, pt reports she had episode of "aching" in her chest only lasted momentarily last night while lying down, denies any SOB, N/V, numbness/tingling, pain to should neck or jaw, denies any active chest pain at present, pt reports it was relieved with body position, informed pt is she experiences active chest pain especially with any of those symptoms listed to go to ER for eval  "

## 2023-05-19 NOTE — TELEPHONE ENCOUNTER
----- Message from Yue Gomez sent at 5/19/2023  2:13 PM CDT -----  Regarding: Needs Nurse Concerning surgery in November (denial overturned)  Contact: @[phone]  Pt is calling about the surgery in November (denial overturned) @[phone]     Please call and advise

## 2023-05-19 NOTE — TELEPHONE ENCOUNTER
----- Message from Daniella Tan MA sent at 5/19/2023  1:43 PM CDT -----  Regarding: FW: medication hold    ----- Message -----  From: Ana Maria Ruth  Sent: 5/19/2023   1:25 PM CDT  To: [facility]AME[facility] Staff  Subject: medication hold                                  The pt is calling to get a medication hold. Please call her back @ [phone]. Thanks, Ana Maria

## 2023-05-24 ENCOUNTER — TELEPHONE (OUTPATIENT)
Dept: BARIATRICS | Facility: CLINIC | Age: 48
End: 2023-05-24
Payer: COMMERCIAL

## 2023-05-24 NOTE — TELEPHONE ENCOUNTER
----- Message from Hollie Orteddy sent at 5/24/2023  2:11 PM CDT -----  Contact: Pt [phone]  Returning a phone call.    Who left a message for the patient:  no vm    Do they know what this is regarding:  appt     Would they like a phone call back or a response via [employer]:   call back

## 2023-05-25 NOTE — TELEPHONE ENCOUNTER
Called and spoke with the pt.  She stated that she tried to log on to her previous appt with MARK De Jesus NP but the [facility] was down.  Rescheduled her appt and alerted MARK De Jesus NP of the [facility] issue.

## 2023-05-30 ENCOUNTER — OFFICE VISIT (OUTPATIENT)
Dept: BARIATRICS | Facility: CLINIC | Age: 48
End: 2023-05-30
Payer: COMMERCIAL

## 2023-05-30 VITALS — WEIGHT: 293 LBS | BODY MASS INDEX: 44.6 KG/M2

## 2023-05-30 DIAGNOSIS — E66.01 MORBID OBESITY WITH BMI OF 45.0-49.9, ADULT: ICD-10-CM

## 2023-05-30 DIAGNOSIS — Z98.0 S/P BYPASS GASTROENTEROSTOMY: Primary | ICD-10-CM

## 2023-05-30 PROCEDURE — 4010F ACE/ARB THERAPY RXD/TAKEN: CPT | Mod: CPTII,95,, | Performed by: NURSE PRACTITIONER

## 2023-05-30 PROCEDURE — 1159F MED LIST DOCD IN RCRD: CPT | Mod: CPTII,95,, | Performed by: NURSE PRACTITIONER

## 2023-05-30 PROCEDURE — 1160F RVW MEDS BY RX/DR IN RCRD: CPT | Mod: CPTII,95,, | Performed by: NURSE PRACTITIONER

## 2023-05-30 PROCEDURE — 4010F PR ACE/ARB THEARPY RXD/TAKEN: ICD-10-PCS | Mod: CPTII,95,, | Performed by: NURSE PRACTITIONER

## 2023-05-30 PROCEDURE — 99213 OFFICE O/P EST LOW 20 MIN: CPT | Mod: 95,,, | Performed by: NURSE PRACTITIONER

## 2023-05-30 PROCEDURE — 3008F PR BODY MASS INDEX (BMI) DOCUMENTED: ICD-10-PCS | Mod: CPTII,95,, | Performed by: NURSE PRACTITIONER

## 2023-05-30 PROCEDURE — 99213 PR OFFICE/OUTPT VISIT, EST, LEVL III, 20-29 MIN: ICD-10-PCS | Mod: 95,,, | Performed by: NURSE PRACTITIONER

## 2023-05-30 PROCEDURE — 1159F PR MEDICATION LIST DOCUMENTED IN MEDICAL RECORD: ICD-10-PCS | Mod: CPTII,95,, | Performed by: NURSE PRACTITIONER

## 2023-05-30 PROCEDURE — 1160F PR REVIEW ALL MEDS BY PRESCRIBER/CLIN PHARMACIST DOCUMENTED: ICD-10-PCS | Mod: CPTII,95,, | Performed by: NURSE PRACTITIONER

## 2023-05-30 PROCEDURE — 3008F BODY MASS INDEX DOCD: CPT | Mod: CPTII,95,, | Performed by: NURSE PRACTITIONER

## 2023-05-30 NOTE — PROGRESS NOTES
The patient location is: Car  The chief complaint leading to consultation is: Re eval for consult     Visit type: audio only    Notes:    Subjective:  Review of Systems:   Negative unless otherwise noted positive-  Gen- Weakness/ Fatigue  Neuro- Confusion  CV- Chest Pain/ Palpitations  Resp: Cough/ SOB  GI- Nausea/Vomiting  Extrem- Pain/Swelling      40 minutes of total time spent on the encounter, which includes face to face time and non-face to face time preparing to see the patient (eg, review of tests), Obtaining and/or reviewing separately obtained history, Documenting clinical information in the electronic or other health record, Independently interpreting results (not separately reported) and communicating results to the patient/family/caregiver, or Care coordination (not separately reported).     Each patient to whom he or she provides medical services by telemedicine is:  (1) informed of the relationship between the physician and patient and the respective role of any other health care provider with respect to management of the patient; and (2) notified that he or she may decline to receive medical services by telemedicine and may withdraw from such care at any time      BARIATRIC NEW PATIENT EVALUATION    CHIEF COMPLAINT:   Morbid obesity, body mass index is 44.6 kg/m². and inability to maintain weight loss.    HPI:  Radha Francis is a 47 y.o. morbidly obese female. Her current body mass index is 44.6 kg/m². She has associated comorbidities including essential hypertension and A. Fib.  She patient was  not successful with  weight loss.   Her current exercise includes none 0 times a week. She denies any history of eating disorder such as anorexia, bulimia, or taking laxatives for weight loss, and denies any addiction including illicit substances, alcohol, or gambling.  Patient states she has a good  support system.  She lives with family.  She is currently employed [employer] .  She  endorses a 1 year history of  GERD.  The patient's goal is 200 lbs.    Pt has c/o increased reflux and belching. Pt states she takes OTC antiacids as needed. Denies smoking or alcohol use.     ESS: Score of 0, reviewed 2023.  Does not need Sleep Study.    Pre op weight-400  Lowest-200's  Current-302  IBW-153      EGD: 2022  The gastric        pouch length was 6 cm with GJ anastomosis located at 46 cm from        incisors; the diameter of the anastomosis was approximately 20 mm        (used an open cold biopsy forceps which is 5mm as referrance for        diameter size). Blind limb and efferent limbs examined and no        evidence of abnormal mucosa or bleeding. The blind limb measured 7        cm in total. Biopsies were taken with a cold forceps for        Helicobacter pylori testing from gastric pouch.        The examined jejunum was normal. Biopsies for histology were taken        with a cold forceps for evaluation of celiac disease.     Impression:     - Normal esophagus.                          - Evidence of prior Jm-en-Y. See above for                          anatomic measurements.                          - Normal examined jejunum.     UGI: 2022 Postsurgical changes of gastric bypass, without abnormality.       CXR:  There is no radiographic evidence for acute intrathoracic process.  EK/23 Sinus Bradycardia HRT 57    PAST MEDICAL HISTORY:  Past Medical History:   Diagnosis Date    Atrial fibrillation     Hypertension        PAST SURGICAL HISTORY:  Past Surgical History:   Procedure Laterality Date    ECHOCARDIOGRAM,TRANSESOPHAGEAL N/A 5/15/2023    Procedure: Transesophageal echo (YANIV) intra-procedure log documentation;  Surgeon: Ren Vanessa MD;  Location: [facility] EP LAB;  Service: Cardiology;  Laterality: N/A;    ESOPHAGOGASTRODUODENOSCOPY N/A 2022    Procedure: EGD (ESOPHAGOGASTRODUODENOSCOPY);  Surgeon: Sadia Ovalles MD;  [facility]  Service: Endoscopy;  Laterality: N/A;  fully  vaccinated.EC    GASTRIC BYPASS      2007    TREATMENT OF CARDIAC ARRHYTHMIA N/A 5/15/2023    Procedure: Cardioversion or Defibrillation;  Surgeon: Ethan Peña MD;  Location: [facility];  Service: Cardiology;  Laterality: N/A;  AF, YANIV/DCCV, ANES, DM,        FAMILY HISTORY:  Family History   Problem Relation Age of Onset    Cancer Mother         SOCIAL HISTORY:  Social History     Socioeconomic History    Marital status:    Tobacco Use    Smoking status: Never    Smokeless tobacco: Never   Substance and Sexual Activity    Alcohol use: Yes     Comment: occasionally    Drug use: Never     Social Determinants of Health     Financial Resource Strain: Low Risk     Difficulty of Paying Living Expenses: Not very hard   Food Insecurity: No Food Insecurity    Worried About Running Out of Food in the Last Year: Never true    Ran Out of Food in the Last Year: Never true   Transportation Needs: No Transportation Needs    Lack of Transportation (Medical): No    Lack of Transportation (Non-Medical): No   Physical Activity: Inactive    Days of Exercise per Week: 0 days    Minutes of Exercise per Session: 0 min   Stress: No Stress Concern Present    Feeling of Stress : Not at all   Social Connections: Unknown    Frequency of Communication with Friends and Family: Twice a week    Frequency of Social Gatherings with Friends and Family: Twice a week    Attends Yarsani Services: 1 to 4 times per year    Active Member of Clubs or Organizations: No    Attends Club or Organization Meetings: Never   Housing Stability: Unknown    Unable to Pay for Housing in the Last Year: No    Unstable Housing in the Last Year: No       MEDICATIONS:  Medications have been reviewed.    ALLERGIES:  Allergies have been reviewed.    DIAGNOSIS:  1. Morbid obesity, body mass index is 44.6 kg/m². and inability to maintain weight loss.  2. Co-morbidities: essential hypertension and A. Fib     PLAN:  The patient is a potential candidate for  Bariatric Surgery. The patient is interested in revisional surgery with Dr. Killian. The surgery and post-op care was discussed in detail with the patient. All questions were answered.    Revision surgery and post-op care were discussed in detail with the patient. All questions were answered. Discussed at length that additional bariatric surgery is a only tool to aid in weight loss and that she needs to be committed to the diet and exercise for successful weight loss. Discussed expected weight loss outcomes are limited without significant diet and lifestyle modification. Discussed with patient that additional bariatric surgery is not the easy way out and that it will take plenty of dedication on the patient's part to be successful. Also discussed weight regain if the patient strays from the diet guidelines or exercise requirements. Patient verbalized understanding and agrees to begin work-up with RD and psychology. She will also work with bariatrician to aid in wt loss efforts. Plan to proceed with additional work-up when diet is back on track and pt is in agreement.     Estimated Goal weight is 220-230 lbs.    ORDERS:  1. Financial appt for revision  2. Dietician consult and 3 months of visits    Then will complete work up for revision and present in selection     PCP: Ana Maria [facility]  RTC: As scheduled.

## 2023-05-30 NOTE — PATIENT INSTRUCTIONS
Prior to surgery you will need to complete:  - Financial appt   - Dietitian consult and follow up appointments as needed    Then:  - Labs  - Psychological evaluation, Please call psychiatry [phone] to schedule      In preparation for bariatric surgery, please complete the following:   Discuss your current medications with your primary care provider, remember your medications will need to be crushed, chewable, or in liquid form for the first 2-4 weeks after a gastric bypass or sleeve.  For a gastric band, your medications will need to be crushed indefinitely.    If you take a blood thinner such as: Coumadin (warfarin), Pradaxa (dabigatran), or Plavix (clopidogrel), you will need to speak with your prescribing provider on how or if this medication can be stopped before surgery.   If you take a medication for depression or anxiety, remember to discuss this with the psychologist or psychiatrist that you see.   If you take medication for arthritis on a daily basis that is considered a non-steroidal anti-inflammatory (NSAID), please discuss with your prescribing physician an alternative medication.  After having gastric bypass or gastric sleeve, this group of medications is not appropriate to take due to increased risk of bleeding stomach ulcers.      DEFINITIONS  Appointments: Pre-scheduled meetings or consultations with any physician, advanced practice provider, dietitian, or psychologist, and labs, imaging studies, sleep studies, etc.   Late cancellation: Cancelling an appointment 24-48 hours prior to scheduled time.  No-Show appointment:  is when   You do NOT arrive to your appointment at the time its scheduled.  You call to cancel or cancel via [facility] less than 24 hours in advance of your scheduled appointment  You show up 15 minutes AFTER your scheduled appointment time without any notification of being late.     POLICY  You are allowed up to 3 cancellations for appointments.   After 3 cancellations your  case will be placed on hold for 2 months and after that time you can resume the program.   You are allowed only 1 no-show for an appointment.   You will be re-scheduled one time and if there is a 2nd no-show at any point, your case will be placed on hold for 3 months.  After 3 months you can resume the program.     Upon resuming the program after being placed on hold for either above mentioned reasons, if you have a late cancel or no show for any appointment, the bariatric team will review if youre an appropriate candidate for surgery at the monthly meeting.

## 2023-06-08 ENCOUNTER — HOSPITAL ENCOUNTER (OUTPATIENT)
Dept: CARDIOLOGY | Facility: HOSPITAL | Age: 48
Discharge: HOME | End: 2023-06-08
Attending: INTERNAL MEDICINE
Payer: COMMERCIAL

## 2023-06-08 VITALS
SYSTOLIC BLOOD PRESSURE: 120 MMHG | DIASTOLIC BLOOD PRESSURE: 74 MMHG | WEIGHT: 293 LBS | BODY MASS INDEX: 43.4 KG/M2 | HEART RATE: 62 BPM | RESPIRATION RATE: 18 BRPM | HEIGHT: 69 IN

## 2023-06-08 DIAGNOSIS — I48.91 NEW ONSET ATRIAL FIBRILLATION: ICD-10-CM

## 2023-06-08 DIAGNOSIS — R07.9 CHEST PAIN, UNSPECIFIED TYPE: ICD-10-CM

## 2023-06-08 DIAGNOSIS — I10 ESSENTIAL HYPERTENSION: ICD-10-CM

## 2023-06-08 LAB
ASCENDING AORTA: 3.34 CM
BSA FOR ECHO PROCEDURE: 2.58 M2
CV ECHO LV RWT: 0.36 CM
CV STRESS BASE HR: 62 BPM
DIASTOLIC BLOOD PRESSURE: 74 MMHG
DOP CALC LVOT AREA: 3.8 CM2
DOP CALC LVOT DIAMETER: 2.19 CM
DOP CALC LVOT PEAK VEL: 0.99 M/S
DOP CALC LVOT STROKE VOLUME: 93.22 CM3
DOP CALCLVOT PEAK VEL VTI: 24.76 CM
E WAVE DECELERATION TIME: 186.5 MSEC
E/A RATIO: 1.21
E/E' RATIO: 8.42 M/S
ECHO LV POSTERIOR WALL: 0.91 CM (ref 0.6–1.1)
EJECTION FRACTION: 60 %
FRACTIONAL SHORTENING: 28 % (ref 28–44)
INTERVENTRICULAR SEPTUM: 0.95 CM (ref 0.6–1.1)
IVRT: 74.22 MSEC
LA MAJOR: 5.79 CM
LA MINOR: 5.84 CM
LA WIDTH: 4.51 CM
LEFT ATRIUM SIZE: 3.92 CM
LEFT ATRIUM VOLUME INDEX MOD: 29.4 ML/M2
LEFT ATRIUM VOLUME INDEX: 35.5 ML/M2
LEFT ATRIUM VOLUME MOD: 72.36 CM3
LEFT ATRIUM VOLUME: 87.38 CM3
LEFT INTERNAL DIMENSION IN SYSTOLE: 3.64 CM (ref 2.1–4)
LEFT VENTRICLE DIASTOLIC VOLUME INDEX: 50.21 ML/M2
LEFT VENTRICLE DIASTOLIC VOLUME: 123.51 ML
LEFT VENTRICLE MASS INDEX: 69 G/M2
LEFT VENTRICLE SYSTOLIC VOLUME INDEX: 22.7 ML/M2
LEFT VENTRICLE SYSTOLIC VOLUME: 55.72 ML
LEFT VENTRICULAR INTERNAL DIMENSION IN DIASTOLE: 5.09 CM (ref 3.5–6)
LEFT VENTRICULAR MASS: 170.19 G
LV LATERAL E/E' RATIO: 6.67 M/S
LV SEPTAL E/E' RATIO: 11.43 M/S
MV A" WAVE DURATION": 8.56 MSEC
MV PEAK A VEL: 0.66 M/S
MV PEAK E VEL: 0.8 M/S
MV STENOSIS PRESSURE HALF TIME: 54.09 MS
MV VALVE AREA P 1/2 METHOD: 4.07 CM2
OHS CV CPX 1 MINUTE RECOVERY HEART RATE: 131 BPM
OHS CV CPX 85 PERCENT MAX PREDICTED HEART RATE MALE: 138
OHS CV CPX MAX PREDICTED HEART RATE: 163
OHS CV CPX PATIENT IS FEMALE: 1
OHS CV CPX PATIENT IS MALE: 0
OHS CV CPX PEAK DIASTOLIC BLOOD PRESSURE: 623 MMHG
OHS CV CPX PEAK HEAR RATE: 142 BPM
OHS CV CPX PEAK RATE PRESSURE PRODUCT: NORMAL
OHS CV CPX PEAK SYSTOLIC BLOOD PRESSURE: 152 MMHG
OHS CV CPX PERCENT MAX PREDICTED HEART RATE ACHIEVED: 87
OHS CV CPX RATE PRESSURE PRODUCT PRESENTING: 7440
PISA TR MAX VEL: 2.15 M/S
PULM VEIN S/D RATIO: 1.11
PV PEAK D VEL: 0.61 M/S
PV PEAK S VEL: 0.68 M/S
RA MAJOR: 5.09 CM
RA PRESSURE: 3 MMHG
RA WIDTH: 4.24 CM
RIGHT VENTRICULAR END-DIASTOLIC DIMENSION: 3.47 CM
SINUS: 3.38 CM
STJ: 2.72 CM
SYSTOLIC BLOOD PRESSURE: 120 MMHG
TDI LATERAL: 0.12 M/S
TDI SEPTAL: 0.07 M/S
TDI: 0.1 M/S
TR MAX PG: 18 MMHG
TRICUSPID ANNULAR PLANE SYSTOLIC EXCURSION: 2.11 CM
TV REST PULMONARY ARTERY PRESSURE: 21 MMHG

## 2023-06-08 PROCEDURE — 93351 STRESS TTE COMPLETE: CPT | Mod: 26,,, | Performed by: INTERNAL MEDICINE

## 2023-06-08 PROCEDURE — 93351 STRESS TTE COMPLETE: CPT

## 2023-06-08 PROCEDURE — 63600150 PHARM REV CODE 636: Performed by: INTERNAL MEDICINE

## 2023-06-08 PROCEDURE — 93351 STRESS ECHO (CUPID ONLY): ICD-10-PCS | Mod: 26,,, | Performed by: INTERNAL MEDICINE

## 2023-06-08 PROCEDURE — 63600175 PHARM REV CODE 636 W HCPCS: Performed by: INTERNAL MEDICINE

## 2023-06-08 RX ORDER — DOBUTAMINE HYDROCHLORIDE 100 MG/100ML
40 INJECTION INTRAVENOUS
Status: COMPLETED | OUTPATIENT
Start: 2023-06-08 | End: 2023-06-08

## 2023-06-08 RX ORDER — ATROPINE SULFATE 0.1 MG/ML
0.75 INJECTION INTRAVENOUS
Status: COMPLETED | OUTPATIENT
Start: 2023-06-08 | End: 2023-06-08

## 2023-06-08 RX ADMIN — DOBUTAMINE HYDROCHLORIDE 40 MCG/KG/MIN: 100 INJECTION INTRAVENOUS at 03:06

## 2023-06-08 RX ADMIN — ATROPINE SULFATE 0.75 MG: 0.1 INJECTION INTRAVENOUS at 03:06

## 2023-06-22 DIAGNOSIS — R07.9 CHEST PAIN, UNSPECIFIED TYPE: Primary | ICD-10-CM

## 2023-06-28 ENCOUNTER — OFFICE VISIT (OUTPATIENT)
Dept: ELECTROPHYSIOLOGY | Facility: CLINIC | Age: 48
End: 2023-06-28
Payer: COMMERCIAL

## 2023-06-28 VITALS
SYSTOLIC BLOOD PRESSURE: 150 MMHG | HEART RATE: 63 BPM | BODY MASS INDEX: 43.4 KG/M2 | WEIGHT: 293 LBS | DIASTOLIC BLOOD PRESSURE: 110 MMHG | HEIGHT: 69 IN

## 2023-06-28 DIAGNOSIS — I48.0 PAROXYSMAL ATRIAL FIBRILLATION: Primary | ICD-10-CM

## 2023-06-28 DIAGNOSIS — R07.9 CHEST PAIN, UNSPECIFIED TYPE: ICD-10-CM

## 2023-06-28 DIAGNOSIS — E66.01 MORBID OBESITY: ICD-10-CM

## 2023-06-28 DIAGNOSIS — I10 ESSENTIAL HYPERTENSION: Chronic | ICD-10-CM

## 2023-06-28 PROCEDURE — 3080F DIAST BP >= 90 MM HG: CPT | Mod: CPTII,S$GLB,, | Performed by: INTERNAL MEDICINE

## 2023-06-28 PROCEDURE — 1159F PR MEDICATION LIST DOCUMENTED IN MEDICAL RECORD: ICD-10-PCS | Mod: CPTII,S$GLB,, | Performed by: INTERNAL MEDICINE

## 2023-06-28 PROCEDURE — 99999 PR PBB SHADOW E&M-EST. PATIENT-LVL III: CPT | Mod: PBBFAC,,, | Performed by: INTERNAL MEDICINE

## 2023-06-28 PROCEDURE — 3008F BODY MASS INDEX DOCD: CPT | Mod: CPTII,S$GLB,, | Performed by: INTERNAL MEDICINE

## 2023-06-28 PROCEDURE — 4010F PR ACE/ARB THEARPY RXD/TAKEN: ICD-10-PCS | Mod: CPTII,S$GLB,, | Performed by: INTERNAL MEDICINE

## 2023-06-28 PROCEDURE — 3008F PR BODY MASS INDEX (BMI) DOCUMENTED: ICD-10-PCS | Mod: CPTII,S$GLB,, | Performed by: INTERNAL MEDICINE

## 2023-06-28 PROCEDURE — 99215 OFFICE O/P EST HI 40 MIN: CPT | Mod: S$GLB,,, | Performed by: INTERNAL MEDICINE

## 2023-06-28 PROCEDURE — 93010 RHYTHM STRIP: ICD-10-PCS | Mod: S$GLB,,, | Performed by: INTERNAL MEDICINE

## 2023-06-28 PROCEDURE — 3080F PR MOST RECENT DIASTOLIC BLOOD PRESSURE >= 90 MM HG: ICD-10-PCS | Mod: CPTII,S$GLB,, | Performed by: INTERNAL MEDICINE

## 2023-06-28 PROCEDURE — 99999 PR PBB SHADOW E&M-EST. PATIENT-LVL III: ICD-10-PCS | Mod: PBBFAC,,, | Performed by: INTERNAL MEDICINE

## 2023-06-28 PROCEDURE — 1159F MED LIST DOCD IN RCRD: CPT | Mod: CPTII,S$GLB,, | Performed by: INTERNAL MEDICINE

## 2023-06-28 PROCEDURE — 93005 ELECTROCARDIOGRAM TRACING: CPT | Mod: S$GLB,,, | Performed by: INTERNAL MEDICINE

## 2023-06-28 PROCEDURE — 3077F PR MOST RECENT SYSTOLIC BLOOD PRESSURE >= 140 MM HG: ICD-10-PCS | Mod: CPTII,S$GLB,, | Performed by: INTERNAL MEDICINE

## 2023-06-28 PROCEDURE — 93005 RHYTHM STRIP: ICD-10-PCS | Mod: S$GLB,,, | Performed by: INTERNAL MEDICINE

## 2023-06-28 PROCEDURE — 99215 PR OFFICE/OUTPT VISIT, EST, LEVL V, 40-54 MIN: ICD-10-PCS | Mod: S$GLB,,, | Performed by: INTERNAL MEDICINE

## 2023-06-28 PROCEDURE — 4010F ACE/ARB THERAPY RXD/TAKEN: CPT | Mod: CPTII,S$GLB,, | Performed by: INTERNAL MEDICINE

## 2023-06-28 PROCEDURE — 93010 ELECTROCARDIOGRAM REPORT: CPT | Mod: S$GLB,,, | Performed by: INTERNAL MEDICINE

## 2023-06-28 PROCEDURE — 3077F SYST BP >= 140 MM HG: CPT | Mod: CPTII,S$GLB,, | Performed by: INTERNAL MEDICINE

## 2023-06-28 RX ORDER — FLECAINIDE ACETATE 100 MG/1
100 TABLET ORAL EVERY 12 HOURS
Qty: 60 TABLET | Refills: 11 | Status: SHIPPED | OUTPATIENT
Start: 2023-06-28 | End: 2024-06-27

## 2023-06-28 NOTE — PROGRESS NOTES
PCP - [facility]  Subjective:     I had the pleasure today of seeing Radha Francis (47 y.o. female) for atrial fibrillation     History:  pAF  HTN  Obesity  Gastric bypass    Background:  - Presented to ED 5/14/23 with chest pain and newly diagnosed AF with RVR and chest pain. Stress echo negative and normal EF. She was discharged Toprol 50 mg daily and Eliquis 5 mg BID  - YANIV/DCCV 5/15/23 > successful with normal EF  - BardyDx 14 days showed 10% AF burden (1 episode lasting 2 days)  - She was symptomatic during that time. PVC 3% and rare atrial ectopy  - She's followed by bariatrics with plans for potential redo-bypass    In clinic today:  She feels well overall. She reports feeling much better following cardioversion. Her major symptom of AF is significant fatigue.   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 Tolerating OAC.    Today's ECG/rhythm strip shows NSR with normal intervals.     Lifestyle & modifiable risk factors  Alcohol?: none  Caffeine?: no caffeine  MARCIA symptoms?: see above  Obesity? Yes, BMI 46    History:     Social History     Tobacco Use    Smoking status: Never    Smokeless tobacco: Never   Substance Use Topics    Alcohol use: Yes     Comment: occasionally     Family History   Problem Relation Age of Onset    Cancer Mother        Meds:   Review of patient's allergies indicates:  No Known Allergies    Current Outpatient Medications:     amLODIPine (NORVASC) 10 MG tablet, Take 1 tablet (10 mg total) by mouth once daily., Disp: 30 tablet, Rfl: 11    apixaban (ELIQUIS) 5 mg Tab, Take 1 tablet (5 mg total) by mouth 2 (two) times daily., Disp: 60 tablet, Rfl: 11    losartan (COZAAR) 25 MG tablet, Take 25 mg by mouth once daily., Disp: , Rfl:     metoprolol succinate (TOPROL-XL) 50 MG 24 hr tablet, Take 1 tablet (50 mg total) by mouth once daily., Disp: 30 tablet, Rfl: 11    Constitution: Negative for fever  or chills. Negative for weight loss or gain.   HENT: Negative for sore throat or headaches. Negative for rhinorrhea.  Eyes: Negative for blurred or double vision.   Cardiovascular: See above  Pulmonary: Negative for SOB. Negative for cough.   Gastrointestinal: Negative for abdominal pain. Negative for nausea/ vomiting. Negative for diarrhea.   : Negative for dysuria.   Neurological: Negative for focal weakness or sensory changes.    Objective:     Vitals:    06/28/23 0958   BP: (!) 150/110   Pulse: 63     General: NAD. AAO. Obese  HENT: No scleral icterus. Extraocular movements intact.  Neck: No JVD  Cardiovascular: Regular heart rate and rhythm. S1/S2 appreciated.  Respiratory: CTAB. No increased work of breathing.  Extremities: Warm. No edema.  Skin: no ulceration or wounds present    Labs & Imaging   Reviewed in clinic today    Assessment:   Radha Francis is a 47 y.o. y.o. female who presented today for evaluation of symptomatic paroxysmal atrial fibrillation. CHADS-VASc = 2 (F, HTN). On OAC and tolerating. EF normal.     I discussed the pathophysiology and risks of atrial fibrillation and the basic pathophysiology, including their health implications and treatment options. Specifically, I addressed the need for CVA (stroke) prophylaxis with aspirin versus oral anticoagulation (warfarin vs DOACs, discussed bleeding risks, and need to come to the ER for any head trauma for CT scanning even if asymptomatic). Their [id number] score is 2 which is associated with a 2.2% annual risk of stroke. I also discussed the goal to reduce symptomatic arrhythmic episodes by pharmacologic and/or procedural methods and utilizing a rhythm versus a rate control strategy. Discussed trial of anti-arrhythmic drug therapy versus ablation. I spent about a half hour discussing the nature of RF-PVI including transseptal puncture. We discussed risks and benefits at length. Our discussion included, but was not limited to the risk of death,  infection, bleeding, stroke, MI, cardiac perforation, embolism, cardiac tamponade, vascular injury, valvular injury, AE fistula, injury to phrenic nerve, pulmonary vein stenosis and other organic injury including the possibility for need for surgery or pacemaker implantation. All questions answered.    Patient would like to proceed with PVI.     1. Paroxysmal atrial fibrillation        2. Essential hypertension        3. Morbid obesity          Plan:     - Will plan for PVI   - Start flecainide while awaiting ablation  - CT chest with contrast to delineate PV anatomy (cryo- vs. RF-PVI)  - Continue Eliquis with last dose being night prior to procedure  - Anes  - JOURDAN      Steve Srivastava MD, PGY7  Electrophysiology

## 2023-06-30 DIAGNOSIS — I48.0 PAROXYSMAL ATRIAL FIBRILLATION: Primary | ICD-10-CM

## 2023-08-02 ENCOUNTER — TELEPHONE (OUTPATIENT)
Dept: SLEEP MEDICINE | Facility: CLINIC | Age: 48
End: 2023-08-02

## 2023-08-02 NOTE — TELEPHONE ENCOUNTER
Staff contacted patient for appointment reminder, patient spoke with staff and informed staff that they would like to cancel their appointment. Reason: patient was unavailable

## 2023-08-03 ENCOUNTER — PATIENT MESSAGE (OUTPATIENT)
Dept: ELECTROPHYSIOLOGY | Facility: CLINIC | Age: 48
End: 2023-08-03
Payer: COMMERCIAL

## 2024-02-08 PROCEDURE — 93010 ELECTROCARDIOGRAM REPORT: CPT | Mod: ,,, | Performed by: INTERNAL MEDICINE

## 2024-02-08 PROCEDURE — 93005 ELECTROCARDIOGRAM TRACING: CPT

## 2024-02-08 PROCEDURE — 99285 EMERGENCY DEPT VISIT HI MDM: CPT | Mod: 25

## 2024-02-08 PROCEDURE — 94761 N-INVAS EAR/PLS OXIMETRY MLT: CPT

## 2024-02-09 ENCOUNTER — HOSPITAL ENCOUNTER (EMERGENCY)
Facility: HOSPITAL | Age: 49
Discharge: HOME | End: 2024-02-09
Attending: STUDENT IN AN ORGANIZED HEALTH CARE EDUCATION/TRAINING PROGRAM
Payer: COMMERCIAL

## 2024-02-09 VITALS
TEMPERATURE: 98 F | OXYGEN SATURATION: 100 % | DIASTOLIC BLOOD PRESSURE: 86 MMHG | SYSTOLIC BLOOD PRESSURE: 156 MMHG | WEIGHT: 293 LBS | RESPIRATION RATE: 16 BRPM | HEIGHT: 68 IN | HEART RATE: 69 BPM | BODY MASS INDEX: 44.41 KG/M2

## 2024-02-09 DIAGNOSIS — R07.9 CHEST PAIN: Primary | ICD-10-CM

## 2024-02-09 DIAGNOSIS — F41.9 ANXIETY: ICD-10-CM

## 2024-02-09 LAB
ALBUMIN SERPL BCP-MCNC: 3.7 G/DL (ref 3.5–5.2)
ALP SERPL-CCNC: 109 U/L (ref 55–135)
ALT SERPL W/O P-5'-P-CCNC: 9 U/L (ref 10–44)
ANION GAP SERPL CALC-SCNC: 8 MMOL/L (ref 8–16)
AST SERPL-CCNC: 11 U/L (ref 10–40)
B-HCG UR QL: NEGATIVE
BASOPHILS # BLD AUTO: 0.03 K/UL (ref 0–0.2)
BASOPHILS NFR BLD: 0.5 % (ref 0–1.9)
BILIRUB SERPL-MCNC: 0.2 MG/DL (ref 0.1–1)
BNP SERPL-MCNC: 29 PG/ML (ref 0–99)
BUN SERPL-MCNC: 13 MG/DL (ref 6–20)
CALCIUM SERPL-MCNC: 9.5 MG/DL (ref 8.7–10.5)
CHLORIDE SERPL-SCNC: 108 MMOL/L (ref 95–110)
CO2 SERPL-SCNC: 24 MMOL/L (ref 23–29)
CREAT SERPL-MCNC: 1.1 MG/DL (ref 0.5–1.4)
CTP QC/QA: YES
DIFFERENTIAL METHOD BLD: ABNORMAL
EOSINOPHIL # BLD AUTO: 0.1 K/UL (ref 0–0.5)
EOSINOPHIL NFR BLD: 1.2 % (ref 0–8)
ERYTHROCYTE [DISTWIDTH] IN BLOOD BY AUTOMATED COUNT: 20.9 % (ref 11.5–14.5)
EST. GFR  (NO RACE VARIABLE): >60 ML/MIN/1.73 M^2
GLUCOSE SERPL-MCNC: 103 MG/DL (ref 70–110)
HCT VFR BLD AUTO: 34.5 % (ref 37–48.5)
HCV AB SERPL QL IA: REACTIVE
HGB BLD-MCNC: 10.4 G/DL (ref 12–16)
IMM GRANULOCYTES # BLD AUTO: 0.01 K/UL (ref 0–0.04)
IMM GRANULOCYTES NFR BLD AUTO: 0.2 % (ref 0–0.5)
LYMPHOCYTES # BLD AUTO: 0.8 K/UL (ref 1–4.8)
LYMPHOCYTES NFR BLD: 14.4 % (ref 18–48)
MCH RBC QN AUTO: 21.2 PG (ref 27–31)
MCHC RBC AUTO-ENTMCNC: 30.1 G/DL (ref 32–36)
MCV RBC AUTO: 70 FL (ref 82–98)
MONOCYTES # BLD AUTO: 0.4 K/UL (ref 0.3–1)
MONOCYTES NFR BLD: 6.9 % (ref 4–15)
NEUTROPHILS # BLD AUTO: 4.4 K/UL (ref 1.8–7.7)
NEUTROPHILS NFR BLD: 76.8 % (ref 38–73)
NRBC BLD-RTO: 0 /100 WBC
OHS QRS DURATION: 92 MS
OHS QTC CALCULATION: 461 MS
PLATELET # BLD AUTO: 320 K/UL (ref 150–450)
PMV BLD AUTO: 9.7 FL (ref 9.2–12.9)
POTASSIUM SERPL-SCNC: 4.3 MMOL/L (ref 3.5–5.1)
PROT SERPL-MCNC: 7.5 G/DL (ref 6–8.4)
RBC # BLD AUTO: 4.91 M/UL (ref 4–5.4)
SODIUM SERPL-SCNC: 140 MMOL/L (ref 136–145)
TROPONIN I SERPL DL<=0.01 NG/ML-MCNC: 0.01 NG/ML (ref 0–0.03)
TROPONIN I SERPL DL<=0.01 NG/ML-MCNC: 0.01 NG/ML (ref 0–0.03)
WBC # BLD AUTO: 5.78 K/UL (ref 3.9–12.7)

## 2024-02-09 PROCEDURE — 85025 COMPLETE CBC W/AUTO DIFF WBC: CPT | Performed by: EMERGENCY MEDICINE

## 2024-02-09 PROCEDURE — 80053 COMPREHEN METABOLIC PANEL: CPT | Performed by: EMERGENCY MEDICINE

## 2024-02-09 PROCEDURE — 81025 URINE PREGNANCY TEST: CPT | Performed by: STUDENT IN AN ORGANIZED HEALTH CARE EDUCATION/TRAINING PROGRAM

## 2024-02-09 PROCEDURE — 84484 ASSAY OF TROPONIN QUANT: CPT | Mod: 91 | Performed by: EMERGENCY MEDICINE

## 2024-02-09 PROCEDURE — 83880 ASSAY OF NATRIURETIC PEPTIDE: CPT | Performed by: EMERGENCY MEDICINE

## 2024-02-09 PROCEDURE — 25000003 PHARM REV CODE 250: Performed by: STUDENT IN AN ORGANIZED HEALTH CARE EDUCATION/TRAINING PROGRAM

## 2024-02-09 PROCEDURE — 86803 HEPATITIS C AB TEST: CPT | Performed by: EMERGENCY MEDICINE

## 2024-02-09 PROCEDURE — 84484 ASSAY OF TROPONIN QUANT: CPT | Performed by: EMERGENCY MEDICINE

## 2024-02-09 RX ORDER — ALUMINUM HYDROXIDE, MAGNESIUM HYDROXIDE, AND SIMETHICONE 1200; 120; 1200 MG/30ML; MG/30ML; MG/30ML
30 SUSPENSION ORAL ONCE
Status: COMPLETED | OUTPATIENT
Start: 2024-02-09 | End: 2024-02-09

## 2024-02-09 RX ORDER — FAMOTIDINE 20 MG/1
20 TABLET, FILM COATED ORAL DAILY
Qty: 30 TABLET | Refills: 0 | Status: SHIPPED | OUTPATIENT
Start: 2024-02-09

## 2024-02-09 RX ORDER — LIDOCAINE HYDROCHLORIDE 20 MG/ML
15 SOLUTION OROPHARYNGEAL ONCE
Status: COMPLETED | OUTPATIENT
Start: 2024-02-09 | End: 2024-02-09

## 2024-02-09 RX ADMIN — ALUMINUM HYDROXIDE, MAGNESIUM HYDROXIDE, AND SIMETHICONE 30 ML: 200; 200; 20 SUSPENSION ORAL at 02:02

## 2024-02-09 RX ADMIN — LIDOCAINE HYDROCHLORIDE 15 ML: 20 SOLUTION OROPHARYNGEAL at 02:02

## 2024-02-09 NOTE — ED PROVIDER NOTES
Encounter Date: 2/8/2024       History     Chief Complaint   Patient presents with    Chest Pain     X1 week. Recently diagnosed with atrial fibrillation. Described as intermittent. Pt supposed to get ablation.      48-year-old female with PMH of a-fib on Eliquis, HTN presenting to E with complaint of intermittent chest pain over the past week. She states it is 3/10, substernal non-radiating and lasts a few seconds. It is not positional. It occurs a couple times per day. She has occasional reflux and is unsure whether it may be related to the discomfort. She denies palpitations, LE edema, cough, congestion, nausea, vomiting, headaches, vision changes, fevers, chills, syncope, lightheadedness. She states that she is scared of having an ablation and has been putting it off. She reports significant stress with her job but no specific stressors recently.       Review of patient's allergies indicates:  No Known Allergies  Past Medical History:   Diagnosis Date    Atrial fibrillation     Hypertension      Past Surgical History:   Procedure Laterality Date    ECHOCARDIOGRAM,TRANSESOPHAGEAL N/A 5/15/2023    Procedure: Transesophageal echo (YANIV) intra-procedure log documentation;  Surgeon: Ren Vanessa MD;  Location: [facility] EP LAB;  Service: Cardiology;  Laterality: N/A;    ESOPHAGOGASTRODUODENOSCOPY N/A 6/30/2022    Procedure: EGD (ESOPHAGOGASTRODUODENOSCOPY);  Surgeon: Sadia Ovalles MD;  Location: [facility] ([address] [place] [address]);  Service: Endoscopy;  Laterality: N/A;  fully vaccinated.EC    GASTRIC BYPASS      2007    TREATMENT OF CARDIAC ARRHYTHMIA N/A 5/15/2023    Procedure: Cardioversion or Defibrillation;  Surgeon: Ethan Peña MD;  Location: [facility] EP LAB;  Service: Cardiology;  Laterality: N/A;  AF, YANIV/DCCV, ANES, DM,      Family History   Problem Relation Age of Onset    Cancer Mother      Social History     Tobacco Use    Smoking status: Never    Smokeless tobacco: Never   Substance Use Topics    Alcohol use:  Yes     Comment: occasionally    Drug use: Never         Physical Exam     Initial Vitals [02/08/24 2321]   BP Pulse Resp Temp SpO2   (!) 174/83 63 16 98.1 °F (36.7 °C) 98 %      MAP       --         Physical Exam    Nursing note and vitals reviewed.  Constitutional: She is not diaphoretic. No distress.   HENT:   Head: Normocephalic and atraumatic.   Mouth/Throat: Oropharynx is clear and moist.   Eyes: Conjunctivae and EOM are normal.   Neck: Neck supple.   Normal range of motion.  Cardiovascular:  Normal rate, regular rhythm and normal heart sounds.           Pulmonary/Chest: Breath sounds normal. She has no wheezes. She has no rhonchi. She has no rales.   Abdominal: Abdomen is soft. She exhibits no distension. There is no abdominal tenderness.   Musculoskeletal:         General: No edema. Normal range of motion.      Cervical back: Normal range of motion and neck supple.     Neurological: She is alert and oriented to person, place, and time.   Skin: Skin is warm and dry. Capillary refill takes less than 2 seconds.         ED Course   Procedures  Labs Reviewed   CBC W/ AUTO DIFFERENTIAL - Abnormal; Notable for the following components:       Result Value    Hemoglobin 10.4 (*)     Hematocrit 34.5 (*)     MCV 70 (*)     MCH 21.2 (*)     MCHC 30.1 (*)     RDW 20.9 (*)     Lymph # 0.8 (*)     Gran % 76.8 (*)     Lymph % 14.4 (*)     All other components within normal limits   COMPREHENSIVE METABOLIC PANEL - Abnormal; Notable for the following components:    ALT 9 (*)     All other components within normal limits   TROPONIN I   B-TYPE NATRIURETIC PEPTIDE   TROPONIN I   HEPATITIS C ANTIBODY   POCT URINE PREGNANCY   POCT TROPONIN   POCT TROPONIN          Imaging Results              X-Ray Chest 1 View (Final result)  Result time 02/09/24 03:38:05      Final result by Alberto Aj MD (02/09/24 03:38:05)                   Impression:      No acute radiographic abnormality in the visualized chest.      Electronically  signed by: Alberto Aj  Date:    02/09/2024  Time:    03:38               Narrative:    EXAMINATION:  XR CHEST 1 VIEW    CLINICAL HISTORY:  Chest pain, unspecified    TECHNIQUE:  Single frontal view of the chest was performed.    COMPARISON:  Portable chest x-ray 05/14/2023 PA and lateral chest x-rays 09/21/2007    FINDINGS:  Midline thoracic trachea.    Tortuous thoracic aorta.    Normal sized cardiomediastinal silhouette.    No consolidation, pulmonary edema, discrete pneumothorax, or blunting of either lateral costophrenic angle is demonstrated radiographically.    There remains partial eventration or other mild elevation of the right hemidiaphragm, similar to 2007.    Osseous detail is suboptimally visualized, especially in the spine.    Degenerative changes in the AC joints glenohumeral joints bilaterally.    Bilateral hook humeral head osteophytes, as may be seen with CPPD arthropathy.    Clothing artifacts overlie the chest.                                       Medications   aluminum-magnesium hydroxide-simethicone 200-200-20 mg/5 mL suspension 30 mL (30 mLs Oral Given 2/9/24 0221)     And   LIDOcaine viscous HCl 2% oral solution 15 mL (15 mLs Oral Given 2/9/24 0220)     Medical Decision Making  Patient is hemodynamically stable and nontoxic appearing.  EKG shows no ST elevations and is unchanged from prior.  Differential diagnosis includes but is not limited to ACS, anxiety, GERD, PE, myocarditis. Cardiac workup initiated. CBC shows stable microcytic anemia. Patient has iron supplements at home but avoids them due to constipation side effect. CMP, troponin and BNP are all within normal limits. Repeat troponin is also within normal limits. CXR demonstrates no acute cardiopulmonary processes. Patient given GI cocktail.  On reassessment, patient denies recurrence of symptoms. Patient advised to schedule close follow-up with their PCP and EP for discussion of possible ablation.  Short course of famotidine  given for reflux symptoms.  Patient verbalized understanding and agreement with plan. All questions answered. Patient discharged home with strict return precautions.      Amount and/or Complexity of Data Reviewed  Labs: ordered. Decision-making details documented in ED Course.  Radiology: ordered. Decision-making details documented in ED Course.  ECG/medicine tests: ordered and independent interpretation performed. Decision-making details documented in ED Course.    Risk  OTC drugs.  Prescription drug management.                                      Clinical Impression:  Final diagnoses:  [R07.9] Chest pain (Primary)  [F41.9] Anxiety                 Nancy Luna MD  Resident  02/09/24 [id number]

## 2024-02-09 NOTE — Clinical Note
"Radha Tabaresvishnu Francis was seen and treated in our emergency department on 2/8/2024.  She may return to work on 02/11/2024.       If you have any questions or concerns, please don't hesitate to call.      Nancy Luna MD"

## 2024-02-09 NOTE — DISCHARGE INSTRUCTIONS
Home Care Instructions:  - Medications: Continue taking your home medications as prescribed    Follow-Up Plan:  - Follow-up with: Primary care doctor and Cardiology (Dr. Peña)  - Additional testing and/or evaluation will be directed by your primary doctor    Return to the Emergency Department for symptoms including but not limited to: worsening symptoms, severe back pain, shortness of breath or chest pain, vomiting with inability to hold down fluids, blood in vomit or poop, fevers greater than 100.4°F, passing out/fainting/unconsciousness, or other concerning symptoms.

## 2024-02-12 ENCOUNTER — TELEPHONE (OUTPATIENT)
Dept: ELECTROPHYSIOLOGY | Facility: CLINIC | Age: 49
End: 2024-02-12
Payer: COMMERCIAL

## 2024-02-12 DIAGNOSIS — I48.0 PAROXYSMAL ATRIAL FIBRILLATION: Primary | ICD-10-CM

## 2024-03-19 PROBLEM — Z79.01 ON ANTICOAGULANT THERAPY: Status: ACTIVE | Noted: 2024-03-19

## 2024-04-02 RX ORDER — METOPROLOL SUCCINATE 50 MG/1
50 TABLET, EXTENDED RELEASE ORAL DAILY
Qty: 30 TABLET | Refills: 11 | OUTPATIENT
Start: 2024-04-02 | End: 2025-04-02

## 2024-05-01 RX ORDER — METOPROLOL SUCCINATE 50 MG/1
50 TABLET, EXTENDED RELEASE ORAL DAILY
Qty: 90 TABLET | Refills: 3 | Status: SHIPPED | OUTPATIENT
Start: 2024-05-01 | End: 2025-05-01

## 2024-06-13 DIAGNOSIS — I48.91 NEW ONSET ATRIAL FIBRILLATION: ICD-10-CM

## 2024-06-13 RX ORDER — APIXABAN 5 MG/1
5 TABLET, FILM COATED ORAL 2 TIMES DAILY
Qty: 60 TABLET | Refills: 11 | Status: CANCELLED | OUTPATIENT
Start: 2024-06-13

## 2024-06-14 DIAGNOSIS — I48.91 NEW ONSET ATRIAL FIBRILLATION: ICD-10-CM

## 2024-06-14 RX ORDER — AMLODIPINE BESYLATE 10 MG/1
10 TABLET ORAL DAILY
Qty: 30 TABLET | Refills: 3 | Status: SHIPPED | OUTPATIENT
Start: 2024-06-14 | End: 2025-06-14

## 2024-06-14 RX ORDER — AMLODIPINE BESYLATE 10 MG/1
10 TABLET ORAL DAILY
Qty: 30 TABLET | Refills: 11 | Status: CANCELLED | OUTPATIENT
Start: 2024-06-14 | End: 2025-06-14

## 2024-07-02 ENCOUNTER — PATIENT MESSAGE (OUTPATIENT)
Dept: ELECTROPHYSIOLOGY | Facility: CLINIC | Age: 49
End: 2024-07-02
Payer: COMMERCIAL

## 2024-08-12 ENCOUNTER — PATIENT MESSAGE (OUTPATIENT)
Dept: ELECTROPHYSIOLOGY | Facility: CLINIC | Age: 49
End: 2024-08-12
Payer: COMMERCIAL

## 2024-08-29 ENCOUNTER — TELEPHONE (OUTPATIENT)
Dept: ELECTROPHYSIOLOGY | Facility: CLINIC | Age: 49
End: 2024-08-29
Payer: COMMERCIAL

## 2024-10-23 ENCOUNTER — HOSPITAL ENCOUNTER (OUTPATIENT)
Dept: CARDIOLOGY | Facility: CLINIC | Age: 49
Discharge: HOME | End: 2024-10-23
Payer: COMMERCIAL

## 2024-10-23 ENCOUNTER — OFFICE VISIT (OUTPATIENT)
Dept: ELECTROPHYSIOLOGY | Facility: CLINIC | Age: 49
End: 2024-10-23
Payer: COMMERCIAL

## 2024-10-23 VITALS
HEART RATE: 68 BPM | BODY MASS INDEX: 44.41 KG/M2 | HEIGHT: 68 IN | DIASTOLIC BLOOD PRESSURE: 80 MMHG | SYSTOLIC BLOOD PRESSURE: 140 MMHG | WEIGHT: 293 LBS

## 2024-10-23 DIAGNOSIS — I48.0 PAROXYSMAL ATRIAL FIBRILLATION: Primary | ICD-10-CM

## 2024-10-23 DIAGNOSIS — I48.0 PAROXYSMAL ATRIAL FIBRILLATION: ICD-10-CM

## 2024-10-23 DIAGNOSIS — E66.01 SEVERE OBESITY (BMI >= 40): Chronic | ICD-10-CM

## 2024-10-23 LAB
OHS QRS DURATION: 88 MS
OHS QTC CALCULATION: 482 MS

## 2024-10-23 PROCEDURE — 99214 OFFICE O/P EST MOD 30 MIN: CPT | Mod: S$GLB,,, | Performed by: INTERNAL MEDICINE

## 2024-10-23 PROCEDURE — 1160F RVW MEDS BY RX/DR IN RCRD: CPT | Mod: CPTII,S$GLB,, | Performed by: INTERNAL MEDICINE

## 2024-10-23 PROCEDURE — 3008F BODY MASS INDEX DOCD: CPT | Mod: CPTII,S$GLB,, | Performed by: INTERNAL MEDICINE

## 2024-10-23 PROCEDURE — 4010F ACE/ARB THERAPY RXD/TAKEN: CPT | Mod: CPTII,S$GLB,, | Performed by: INTERNAL MEDICINE

## 2024-10-23 PROCEDURE — 3079F DIAST BP 80-89 MM HG: CPT | Mod: CPTII,S$GLB,, | Performed by: INTERNAL MEDICINE

## 2024-10-23 PROCEDURE — 3077F SYST BP >= 140 MM HG: CPT | Mod: CPTII,S$GLB,, | Performed by: INTERNAL MEDICINE

## 2024-10-23 PROCEDURE — 93010 ELECTROCARDIOGRAM REPORT: CPT | Mod: S$GLB,,, | Performed by: INTERNAL MEDICINE

## 2024-10-23 PROCEDURE — 99999 PR PBB SHADOW E&M-EST. PATIENT-LVL III: CPT | Mod: PBBFAC,,, | Performed by: INTERNAL MEDICINE

## 2024-10-23 PROCEDURE — 1159F MED LIST DOCD IN RCRD: CPT | Mod: CPTII,S$GLB,, | Performed by: INTERNAL MEDICINE

## 2024-10-23 RX ORDER — FLECAINIDE ACETATE 100 MG/1
100 TABLET ORAL EVERY 12 HOURS
Qty: 180 TABLET | Refills: 3 | Status: SHIPPED | OUTPATIENT
Start: 2024-10-23

## 2024-10-23 NOTE — PROGRESS NOTES
[place]P - [place]  Subjective:     I had the pleasure today of seeing Radha Francis (49 y.o. female) for atrial fibrillation     History:  pAF  HTN  Obesity  Gastric bypass    Background:  - Presented to ED 5/14/23 with chest pain and newly diagnosed AF with RVR and chest pain. Stress echo negative and normal EF. She was discharged Toprol 50 mg daily and Eliquis 5 mg BID  - YANIV/DCCV 5/15/23 > successful with normal EF  - BardyDx 14 days showed 10% AF burden (1 episode lasting 2 days)  - She was symptomatic during that time. PVC 3% and rare atrial ectopy  - She's followed by bariatrics with plans for potential redo-bypass    She feels well overall. She reports feeling much better following cardioversion. Her major symptom of AF is significant fatigue.   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 Tolerating OAC.    Today's ECG/rhythm strip shows NSR with normal intervals.     Lifestyle & modifiable risk factors  Alcohol?: none  Caffeine?: no caffeine  MARCIA symptoms?: see above  Obesity? Yes, BMI 46    Update 10/2024:  Planned PVI last year, but she decided against it. No AF recurrence.    My interpretation of today's ECG is NSR    History:     Social History     Tobacco Use    Smoking status: Never    Smokeless tobacco: Never   Substance Use Topics    Alcohol use: Yes     Comment: occasionally     Family History   Problem Relation Name Age of Onset    Cancer Mother         Meds:   Review of patient's allergies indicates:  No Known Allergies    Current Outpatient Medications:     amLODIPine (NORVASC) 10 MG tablet, Take 1 tablet (10 mg total) by mouth once daily., Disp: 30 tablet, Rfl: 3    apixaban (ELIQUIS) 5 mg Tab, Take 1 tablet (5 mg total) by mouth 2 (two) times daily., Disp: 60 tablet, Rfl: 3    famotidine (PEPCID) 20 MG tablet, Take 1 tablet (20 mg total) by mouth once daily., Disp: 30 tablet, Rfl: 0    losartan  (COZAAR) 25 MG tablet, Take 25 mg by mouth once daily., Disp: , Rfl:     metoprolol succinate (TOPROL-XL) 50 MG 24 hr tablet, Take 1 tablet (50 mg total) by mouth once daily., Disp: 90 tablet, Rfl: 3    flecainide (TAMBOCOR) 100 MG Tab, Take 1 tablet (100 mg total) by mouth every 12 (twelve) hours., Disp: 180 tablet, Rfl: 3    Constitution: Negative for fever or chills. Negative for weight loss or gain.   HENT: Negative for sore throat or headaches. Negative for rhinorrhea.  Eyes: Negative for blurred or double vision.   Cardiovascular: See above  Pulmonary: Negative for SOB. Negative for cough.   Gastrointestinal: Negative for abdominal pain. Negative for nausea/ vomiting. Negative for diarrhea.   : Negative for dysuria.   Neurological: Negative for focal weakness or sensory changes.    Objective:       General: NAD. AAO. Obese  HENT: No scleral icterus. Extraocular movements intact.  Neck: No JVD  Cardiovascular: Regular heart rate and rhythm. S1/S2 appreciated.  Respiratory: CTAB. No increased work of breathing.  Extremities: Warm. No edema.  Skin: no ulceration or wounds present    Labs & Imaging   Reviewed in clinic today    Assessment:   Radha Francis is a 49 y.o. y.o. female who presented today for evaluation of symptomatic paroxysmal atrial fibrillation. CHADS-VASc = 2 (F, HTN). On OAC and tolerating. EF normal.       1. Paroxysmal atrial fibrillation        2. Severe obesity (BMI >= 40)          Plan:     Continue BB, flecainide.  f/u 1 year or earlier prn

## 2025-03-18 DIAGNOSIS — M25.512 BILATERAL SHOULDER PAIN, UNSPECIFIED CHRONICITY: Primary | ICD-10-CM

## 2025-03-18 DIAGNOSIS — M25.511 BILATERAL SHOULDER PAIN, UNSPECIFIED CHRONICITY: Primary | ICD-10-CM

## 2025-03-19 ENCOUNTER — HOSPITAL ENCOUNTER (OUTPATIENT)
Dept: RADIOLOGY | Facility: HOSPITAL | Age: 50
Discharge: HOME | End: 2025-03-19
Attending: ORTHOPAEDIC SURGERY
Payer: COMMERCIAL

## 2025-03-19 ENCOUNTER — OFFICE VISIT (OUTPATIENT)
Dept: ORTHOPEDICS | Facility: CLINIC | Age: 50
End: 2025-03-19
Payer: COMMERCIAL

## 2025-03-19 VITALS — WEIGHT: 293 LBS | BODY MASS INDEX: 44.41 KG/M2 | HEIGHT: 68 IN

## 2025-03-19 DIAGNOSIS — M25.512 BILATERAL SHOULDER PAIN, UNSPECIFIED CHRONICITY: ICD-10-CM

## 2025-03-19 DIAGNOSIS — M19.012 LOCALIZED OSTEOARTHRITIS OF SHOULDER REGIONS, BILATERAL: Primary | ICD-10-CM

## 2025-03-19 DIAGNOSIS — M19.011 LOCALIZED OSTEOARTHRITIS OF SHOULDER REGIONS, BILATERAL: Primary | ICD-10-CM

## 2025-03-19 DIAGNOSIS — M25.511 BILATERAL SHOULDER PAIN, UNSPECIFIED CHRONICITY: ICD-10-CM

## 2025-03-19 PROCEDURE — 3008F BODY MASS INDEX DOCD: CPT | Mod: CPTII,S$GLB,, | Performed by: ORTHOPAEDIC SURGERY

## 2025-03-19 PROCEDURE — 99999 PR PBB SHADOW E&M-EST. PATIENT-LVL III: CPT | Mod: PBBFAC,,, | Performed by: ORTHOPAEDIC SURGERY

## 2025-03-19 PROCEDURE — 4010F ACE/ARB THERAPY RXD/TAKEN: CPT | Mod: CPTII,S$GLB,, | Performed by: ORTHOPAEDIC SURGERY

## 2025-03-19 PROCEDURE — 1159F MED LIST DOCD IN RCRD: CPT | Mod: CPTII,S$GLB,, | Performed by: ORTHOPAEDIC SURGERY

## 2025-03-19 PROCEDURE — 73030 X-RAY EXAM OF SHOULDER: CPT | Mod: 26,50,, | Performed by: RADIOLOGY

## 2025-03-19 PROCEDURE — 73030 X-RAY EXAM OF SHOULDER: CPT | Mod: TC,50,PO

## 2025-03-19 PROCEDURE — 99204 OFFICE O/P NEW MOD 45 MIN: CPT | Mod: S$GLB,,, | Performed by: ORTHOPAEDIC SURGERY

## 2025-03-19 RX ORDER — MELOXICAM 15 MG/1
15 TABLET ORAL DAILY
Qty: 30 TABLET | Refills: 1 | Status: SHIPPED | OUTPATIENT
Start: 2025-03-19

## 2025-03-19 NOTE — PROGRESS NOTES
Subjective:      Patient ID: Radha Francis is a 49 y.o. female.    Chief Complaint: No chief complaint on file.    HPI  Patient presents with a longstanding bilateral left greater than right shoulder pain.  She has noticed generalized stiffness and pain increasing of late.  She is right-hand dominant drives a city bus.  Denies any trauma to her shoulders.  She has had no recent treatment.  ROS      Objective:    Ortho Exam     Constitutional:   Patient is alert  and oriented in no acute distress  HEENT:  normocephalic atraumatic; PERRL EOMI  Neck:  Supple without adenopathy  Cardiovascular:  Normal rate and rhythm  Pulmonary:  Normal respiratory effort normal chest wall expansion  Abdominal:  Nonprotuberant nondistended  Musculoskeletal:  Patient has a moderate limitation with range of motion significant crepitus  Adequately maintained strength  Neurological:  No focal defect; cranial nerves 2-12 grossly intact  Psychiatric/behavioral:  Mood and behavior normal           My Radiographs Findings:    Radiographs of both shoulder show severe degenerative change joint space narrowing subchondral sclerosis osteophyte formation cystic changes  Assessment:       Encounter Diagnosis   Name Primary?    Localized osteoarthritis of shoulder regions, bilateral Yes         Plan:       I have discussed medical condition treatment options with her at length she declines an injection today.  We will give her a trial of meloxicam GI cardiac and renal precautions were discussed.  We have discussed general range of motion strengthening exercises follow up in 6 weeks symptoms fail to improve sooner if any questions or problems        Past Medical History:   Diagnosis Date    Atrial fibrillation     Hypertension      Past Surgical History:   Procedure Laterality Date    ECHOCARDIOGRAM,TRANSESOPHAGEAL N/A 5/15/2023    Procedure: Transesophageal echo (YANIV) intra-procedure log documentation;  Surgeon: Ren Vanessa MD;  Location: [facility]  EP LAB;  Service: Cardiology;  Laterality: N/A;    ESOPHAGOGASTRODUODENOSCOPY N/A 6/30/2022    Procedure: EGD (ESOPHAGOGASTRODUODENOSCOPY);  Surgeon: Sadia Ovalles MD;  [facility]  Service: Endoscopy;  Laterality: N/A;  fully vaccinated.EC    GASTRIC BYPASS      2007    TREATMENT OF CARDIAC ARRHYTHMIA N/A 5/15/2023    Procedure: Cardioversion or Defibrillation;  Surgeon: Ethan Peña MD;  Location: [facility] EP LAB;  Service: Cardiology;  Laterality: N/A;  AF, YANIV/DCCV, ANES, DM,        Current Medications[1]    Review of patient's allergies indicates:  No Known Allergies    Family History   Problem Relation Name Age of Onset    Cancer Mother       Social History     Occupational History    Not on file   Tobacco Use    Smoking status: Never    Smokeless tobacco: Never   Substance and Sexual Activity    Alcohol use: Yes     Comment: occasionally    Drug use: Never    Sexual activity: Not on file            [1]   Current Outpatient Medications:     amLODIPine (NORVASC) 10 MG tablet, Take 1 tablet (10 mg total) by mouth once daily., Disp: 30 tablet, Rfl: 3    apixaban (ELIQUIS) 5 mg Tab, Take 1 tablet (5 mg total) by mouth 2 (two) times daily., Disp: 60 tablet, Rfl: 3    famotidine (PEPCID) 20 MG tablet, Take 1 tablet (20 mg total) by mouth once daily., Disp: 30 tablet, Rfl: 0    flecainide (TAMBOCOR) 100 MG Tab, Take 1 tablet (100 mg total) by mouth every 12 (twelve) hours., Disp: 180 tablet, Rfl: 3    losartan (COZAAR) 25 MG tablet, Take 25 mg by mouth once daily., Disp: , Rfl:     metoprolol succinate (TOPROL-XL) 50 MG 24 hr tablet, Take 1 tablet (50 mg total) by mouth once daily., Disp: 90 tablet, Rfl: 3

## 2025-04-07 ENCOUNTER — PATIENT MESSAGE (OUTPATIENT)
Dept: ELECTROPHYSIOLOGY | Facility: CLINIC | Age: 50
End: 2025-04-07
Payer: COMMERCIAL

## 2025-05-22 NOTE — TELEPHONE ENCOUNTER
Called and spoke with pt. She stated She recently as diagnosed with a-fib.  She called her insurance company who state that if her surgeon does a PA for the surgery then it would be approved.  I questioned what she meant by this.  She said she was unsure.  I informed her that we could have her see a NP who could document all of her medical diagnoses and will do and H and P.  I also informed her that the NP may want her to have a cardiology clear her but that would be determined by the NP.  She could have a letter from the doctor included when the case request was resubmitted. Scheduled virtual appt with NP.  Offered to schedule virtual appt with RD but pt would like to discuss with NP first.    Request for prior authorization on:     Medication: exenatide (Byetta 10 MCG Pen) 10 MCG/0.04ML pen-injector   Quantity requested: 2.4 mL   Pharmacy for prescription has been selected.

## 2025-06-06 ENCOUNTER — TELEPHONE (OUTPATIENT)
Dept: ELECTROPHYSIOLOGY | Facility: CLINIC | Age: 50
End: 2025-06-06
Payer: COMMERCIAL

## 2025-06-06 DIAGNOSIS — I48.91 NEW ONSET ATRIAL FIBRILLATION: ICD-10-CM

## 2025-06-20 DIAGNOSIS — I48.91 NEW ONSET ATRIAL FIBRILLATION: ICD-10-CM

## 2025-06-20 RX ORDER — FLECAINIDE ACETATE 100 MG/1
100 TABLET ORAL EVERY 12 HOURS
Qty: 180 TABLET | Refills: 3 | Status: SHIPPED | OUTPATIENT
Start: 2025-06-20

## (undated) DEVICE — PAD DEFIB CADENCE ADULT R2